# Patient Record
Sex: FEMALE | Race: WHITE | Employment: FULL TIME | URBAN - METROPOLITAN AREA
[De-identification: names, ages, dates, MRNs, and addresses within clinical notes are randomized per-mention and may not be internally consistent; named-entity substitution may affect disease eponyms.]

---

## 2017-01-09 ENCOUNTER — HOSPITAL ENCOUNTER (OUTPATIENT)
Dept: LAB | Age: 47
Discharge: HOME | End: 2017-01-09
Attending: INTERNAL MEDICINE
Payer: COMMERCIAL

## 2017-01-09 DIAGNOSIS — R06.02 SHORTNESS OF BREATH: ICD-10-CM

## 2017-01-09 LAB
BASOPHILS # BLD AUTO: 0.1 K/UL (ref 0–0.2)
BASOPHILS # BLD: 1 % (ref 0–2)
BNP SERPL-MCNC: 15 PG/ML
DIFFERENTIAL METHOD BLD: ABNORMAL
EOSINOPHIL # BLD: 0.4 K/UL (ref 0–0.8)
EOSINOPHIL NFR BLD: 5 % (ref 0.5–7.8)
ERYTHROCYTE [DISTWIDTH] IN BLOOD BY AUTOMATED COUNT: 12.4 % (ref 11.9–14.6)
HCT VFR BLD AUTO: 35.8 % (ref 35.8–46.3)
HGB BLD-MCNC: 12.4 G/DL (ref 11.7–15.4)
LYMPHOCYTES # BLD AUTO: 36 % (ref 13–44)
LYMPHOCYTES # BLD: 2.3 K/UL (ref 0.5–4.6)
MCH RBC QN AUTO: 30.5 PG (ref 26.1–32.9)
MCHC RBC AUTO-ENTMCNC: 34.6 G/DL (ref 31.4–35)
MCV RBC AUTO: 88 FL (ref 79.6–97.8)
MONOCYTES # BLD: 0.5 K/UL (ref 0.1–1.3)
MONOCYTES NFR BLD AUTO: 8 % (ref 4–12)
NEUTS SEG # BLD: 3.3 K/UL (ref 1.7–8.2)
NEUTS SEG NFR BLD AUTO: 50 % (ref 43–78)
PLATELET # BLD AUTO: 288 K/UL (ref 150–450)
PMV BLD AUTO: 9.3 FL (ref 10.8–14.1)
RBC # BLD AUTO: 4.07 M/UL (ref 4.05–5.25)
WBC # BLD AUTO: 6.5 K/UL (ref 4.3–11.1)

## 2017-01-09 PROCEDURE — 83880 ASSAY OF NATRIURETIC PEPTIDE: CPT | Performed by: INTERNAL MEDICINE

## 2017-01-09 PROCEDURE — 36415 COLL VENOUS BLD VENIPUNCTURE: CPT | Performed by: INTERNAL MEDICINE

## 2017-01-09 PROCEDURE — 85025 COMPLETE CBC W/AUTO DIFF WBC: CPT | Performed by: INTERNAL MEDICINE

## 2017-04-04 PROBLEM — K21.9 GASTROESOPHAGEAL REFLUX DISEASE WITHOUT ESOPHAGITIS: Status: ACTIVE | Noted: 2017-04-04

## 2017-07-05 ENCOUNTER — HOSPITAL ENCOUNTER (OUTPATIENT)
Dept: MAMMOGRAPHY | Age: 47
Discharge: HOME | End: 2017-07-05
Attending: OBSTETRICS & GYNECOLOGY
Payer: COMMERCIAL

## 2017-07-05 DIAGNOSIS — Z12.39 SCREENING FOR MALIGNANT NEOPLASM OF BREAST: ICD-10-CM

## 2017-07-05 PROCEDURE — 77067 SCR MAMMO BI INCL CAD: CPT

## 2017-08-01 ENCOUNTER — HOSPITAL ENCOUNTER (OUTPATIENT)
Dept: CT IMAGING | Age: 47
Discharge: HOME | End: 2017-08-01
Attending: OTOLARYNGOLOGY
Payer: COMMERCIAL

## 2017-08-01 DIAGNOSIS — J34.89 SINUS PRESSURE: ICD-10-CM

## 2017-08-01 DIAGNOSIS — R51.9 SINUS HEADACHE: ICD-10-CM

## 2017-08-01 PROCEDURE — 70486 CT MAXILLOFACIAL W/O DYE: CPT

## 2017-08-28 ENCOUNTER — HOSPITAL ENCOUNTER (OUTPATIENT)
Dept: SLEEP MEDICINE | Age: 47
Discharge: HOME | End: 2017-08-28
Attending: OTOLARYNGOLOGY
Payer: COMMERCIAL

## 2017-08-28 PROCEDURE — 95810 POLYSOM 6/> YRS 4/> PARAM: CPT

## 2017-09-27 ENCOUNTER — HOSPITAL ENCOUNTER (OUTPATIENT)
Dept: LAB | Age: 47
Discharge: HOME | End: 2017-09-27
Payer: COMMERCIAL

## 2017-09-27 VITALS — WEIGHT: 175 LBS | HEIGHT: 66 IN | BODY MASS INDEX: 28.12 KG/M2

## 2017-09-27 LAB — POTASSIUM SERPL-SCNC: 3.5 MMOL/L (ref 3.5–5.1)

## 2017-09-27 PROCEDURE — 84132 ASSAY OF SERUM POTASSIUM: CPT | Performed by: ANESTHESIOLOGY

## 2017-09-27 PROCEDURE — 36415 COLL VENOUS BLD VENIPUNCTURE: CPT | Performed by: ANESTHESIOLOGY

## 2017-09-27 RX ORDER — MINERAL OIL
180 ENEMA (ML) RECTAL
COMMUNITY

## 2017-09-27 RX ORDER — FLUTICASONE PROPIONATE 50 MCG
2 SPRAY, SUSPENSION (ML) NASAL DAILY
COMMUNITY
End: 2018-06-25

## 2017-09-27 RX ORDER — IBUPROFEN 200 MG
200 TABLET ORAL AS NEEDED
COMMUNITY

## 2017-09-27 NOTE — PERIOP NOTES
Patient verified name, , and surgery as listed in [facility]. Type 1B surgery, phone assessment complete. Orders not received. Labs per surgeon: no orders on chart at this time  Labs per anesthesia protocol: potassium needed- order placed in EMR on hold for arrival    Patient answered medical/surgical history questions at their best of ability. All prior to admission medications documented in [facility]. Patient instructed to take the following medications the day of surgery according to anesthesia guidelines with a small sip of water: zyrtec or allegra, bystolic, prilosec. Hold all vitamins 7 days prior to surgery and NSAIDS 5 days prior to surgery. Medications to be held- vitamins and ibuprofen. Patient instructed on the following:  Arrive at A Entrance, time of arrival to be called the day before by 1700  NPO after midnight including gum, mints, and ice chips  Responsible adult must drive patient to the hospital, stay during surgery, and patient will need supervision 24 hours after anesthesia  Use antibacterial soap in shower the night before surgery and on the morning of surgery  Leave all valuables (money and jewelry) at home but bring insurance card and ID on DOS  Do not wear make-up, nail polish, lotions, cologne, perfumes, powders, or oil on skin. Patient teach back successful and patient demonstrates knowledge of instruction.

## 2017-09-28 ENCOUNTER — HOSPITAL ENCOUNTER (OUTPATIENT)
Dept: SURGERY | Age: 47
Discharge: HOME | End: 2017-09-28

## 2017-10-04 ENCOUNTER — ANESTHESIA EVENT (OUTPATIENT)
Dept: SURGERY | Age: 47
End: 2017-10-04
Payer: COMMERCIAL

## 2017-10-04 RX ORDER — MIDAZOLAM HYDROCHLORIDE 1 MG/ML
2 INJECTION, SOLUTION INTRAMUSCULAR; INTRAVENOUS ONCE
Status: CANCELLED | OUTPATIENT
Start: 2017-10-04 | End: 2017-10-04

## 2017-10-04 RX ORDER — SODIUM CHLORIDE 0.9 % (FLUSH) 0.9 %
5-10 SYRINGE (ML) INJECTION AS NEEDED
Status: CANCELLED | OUTPATIENT
Start: 2017-10-04

## 2017-10-04 RX ORDER — FENTANYL CITRATE 50 UG/ML
100 INJECTION, SOLUTION INTRAMUSCULAR; INTRAVENOUS ONCE
Status: CANCELLED | OUTPATIENT
Start: 2017-10-04 | End: 2017-10-04

## 2017-10-04 RX ORDER — SODIUM CHLORIDE 0.9 % (FLUSH) 0.9 %
5-10 SYRINGE (ML) INJECTION EVERY 8 HOURS
Status: CANCELLED | OUTPATIENT
Start: 2017-10-04

## 2017-10-04 RX ORDER — CELECOXIB 200 MG/1
200 CAPSULE ORAL
Status: CANCELLED | OUTPATIENT
Start: 2017-10-04

## 2017-10-05 ENCOUNTER — HOSPITAL ENCOUNTER (OUTPATIENT)
Age: 47
Setting detail: OUTPATIENT SURGERY
Discharge: HOME | End: 2017-10-05
Attending: OTOLARYNGOLOGY | Admitting: OTOLARYNGOLOGY
Payer: COMMERCIAL

## 2017-10-05 ENCOUNTER — ANESTHESIA (OUTPATIENT)
Dept: SURGERY | Age: 47
End: 2017-10-05
Payer: COMMERCIAL

## 2017-10-05 VITALS
SYSTOLIC BLOOD PRESSURE: 115 MMHG | OXYGEN SATURATION: 93 % | HEART RATE: 52 BPM | RESPIRATION RATE: 16 BRPM | TEMPERATURE: 97.2 F | BODY MASS INDEX: 29.88 KG/M2 | DIASTOLIC BLOOD PRESSURE: 60 MMHG | HEIGHT: 66 IN | WEIGHT: 185.9 LBS

## 2017-10-05 PROCEDURE — 74011250636 HC RX REV CODE- 250/636

## 2017-10-05 PROCEDURE — 77030008528 HC TBNG IRR MIC/DEB MEDT -B: Performed by: OTOLARYNGOLOGY

## 2017-10-05 PROCEDURE — 74011000250 HC RX REV CODE- 250

## 2017-10-05 PROCEDURE — 77030020782 HC GWN BAIR PAWS FLX 3M -B: Performed by: ANESTHESIOLOGY

## 2017-10-05 PROCEDURE — 77030008357 HC SPLNT NSL INT THOM -B: Performed by: OTOLARYNGOLOGY

## 2017-10-05 PROCEDURE — 77030008703 HC TU ET UNCUF COVD -A: Performed by: ANESTHESIOLOGY

## 2017-10-05 PROCEDURE — 74011000250 HC RX REV CODE- 250: Performed by: OTOLARYNGOLOGY

## 2017-10-05 PROCEDURE — 77030002996 HC SUT SLK J&J -A: Performed by: OTOLARYNGOLOGY

## 2017-10-05 PROCEDURE — 74011250636 HC RX REV CODE- 250/636: Performed by: ANESTHESIOLOGY

## 2017-10-05 PROCEDURE — 74011250637 HC RX REV CODE- 250/637: Performed by: OTOLARYNGOLOGY

## 2017-10-05 PROCEDURE — 76210000020 HC REC RM PH II FIRST 0.5 HR: Performed by: OTOLARYNGOLOGY

## 2017-10-05 PROCEDURE — 77030017013 HC DRSG SNUS MRGEL1 MEDT -B: Performed by: OTOLARYNGOLOGY

## 2017-10-05 PROCEDURE — 74011000250 HC RX REV CODE- 250: Performed by: ANESTHESIOLOGY

## 2017-10-05 PROCEDURE — 77030014153 HC WND COBLATN ENT S&N -C: Performed by: OTOLARYNGOLOGY

## 2017-10-05 PROCEDURE — 77030008477 HC STYL SATN SLP COVD -A: Performed by: ANESTHESIOLOGY

## 2017-10-05 PROCEDURE — 77030018836 HC SOL IRR NACL ICUM -A: Performed by: OTOLARYNGOLOGY

## 2017-10-05 PROCEDURE — 76010000161 HC OR TIME 1 TO 1.5 HR INTENSV-TIER 1: Performed by: OTOLARYNGOLOGY

## 2017-10-05 PROCEDURE — 74011250636 HC RX REV CODE- 250/636: Performed by: OTOLARYNGOLOGY

## 2017-10-05 PROCEDURE — 77030006908 HC BLD SNUS SHV MEDT -D: Performed by: OTOLARYNGOLOGY

## 2017-10-05 PROCEDURE — 76210000006 HC OR PH I REC 0.5 TO 1 HR: Performed by: OTOLARYNGOLOGY

## 2017-10-05 PROCEDURE — 76060000033 HC ANESTHESIA 1 TO 1.5 HR: Performed by: OTOLARYNGOLOGY

## 2017-10-05 RX ORDER — KETOROLAC TROMETHAMINE 30 MG/ML
INJECTION, SOLUTION INTRAMUSCULAR; INTRAVENOUS AS NEEDED
Status: DISCONTINUED | OUTPATIENT
Start: 2017-10-05 | End: 2017-10-05 | Stop reason: HOSPADM

## 2017-10-05 RX ORDER — SODIUM CHLORIDE, SODIUM LACTATE, POTASSIUM CHLORIDE, CALCIUM CHLORIDE 600; 310; 30; 20 MG/100ML; MG/100ML; MG/100ML; MG/100ML
75 INJECTION, SOLUTION INTRAVENOUS CONTINUOUS
Status: DISCONTINUED | OUTPATIENT
Start: 2017-10-05 | End: 2017-10-05 | Stop reason: HOSPADM

## 2017-10-05 RX ORDER — PROPOFOL 10 MG/ML
INJECTION, EMULSION INTRAVENOUS AS NEEDED
Status: DISCONTINUED | OUTPATIENT
Start: 2017-10-05 | End: 2017-10-05 | Stop reason: HOSPADM

## 2017-10-05 RX ORDER — LIDOCAINE HYDROCHLORIDE 10 MG/ML
0.1 INJECTION INFILTRATION; PERINEURAL AS NEEDED
Status: DISCONTINUED | OUTPATIENT
Start: 2017-10-05 | End: 2017-10-05 | Stop reason: HOSPADM

## 2017-10-05 RX ORDER — EPINEPHRINE 1 MG/ML
INJECTION, SOLUTION, CONCENTRATE INTRAVENOUS AS NEEDED
Status: DISCONTINUED | OUTPATIENT
Start: 2017-10-05 | End: 2017-10-05 | Stop reason: HOSPADM

## 2017-10-05 RX ORDER — LIDOCAINE HYDROCHLORIDE 10 MG/ML
INJECTION INFILTRATION; PERINEURAL AS NEEDED
Status: DISCONTINUED | OUTPATIENT
Start: 2017-10-05 | End: 2017-10-05 | Stop reason: HOSPADM

## 2017-10-05 RX ORDER — OXYCODONE HYDROCHLORIDE 5 MG/1
5 TABLET ORAL
Status: DISCONTINUED | OUTPATIENT
Start: 2017-10-05 | End: 2017-10-05 | Stop reason: HOSPADM

## 2017-10-05 RX ORDER — ROCURONIUM BROMIDE 10 MG/ML
INJECTION, SOLUTION INTRAVENOUS AS NEEDED
Status: DISCONTINUED | OUTPATIENT
Start: 2017-10-05 | End: 2017-10-05 | Stop reason: HOSPADM

## 2017-10-05 RX ORDER — OXYMETAZOLINE HCL 0.05 %
2 SPRAY, NON-AEROSOL (ML) NASAL
Status: DISCONTINUED | OUTPATIENT
Start: 2017-10-05 | End: 2017-10-05 | Stop reason: HOSPADM

## 2017-10-05 RX ORDER — SODIUM CHLORIDE 0.9 % (FLUSH) 0.9 %
5-10 SYRINGE (ML) INJECTION AS NEEDED
Status: DISCONTINUED | OUTPATIENT
Start: 2017-10-05 | End: 2017-10-05 | Stop reason: HOSPADM

## 2017-10-05 RX ORDER — KETAMINE HYDROCHLORIDE 50 MG/ML
INJECTION, SOLUTION INTRAMUSCULAR; INTRAVENOUS AS NEEDED
Status: DISCONTINUED | OUTPATIENT
Start: 2017-10-05 | End: 2017-10-05 | Stop reason: HOSPADM

## 2017-10-05 RX ORDER — MIDAZOLAM HYDROCHLORIDE 1 MG/ML
2 INJECTION, SOLUTION INTRAMUSCULAR; INTRAVENOUS
Status: DISCONTINUED | OUTPATIENT
Start: 2017-10-05 | End: 2017-10-05 | Stop reason: HOSPADM

## 2017-10-05 RX ORDER — NEOSTIGMINE METHYLSULFATE 1 MG/ML
INJECTION INTRAVENOUS AS NEEDED
Status: DISCONTINUED | OUTPATIENT
Start: 2017-10-05 | End: 2017-10-05 | Stop reason: HOSPADM

## 2017-10-05 RX ORDER — HYDROMORPHONE HYDROCHLORIDE 2 MG/ML
0.5 INJECTION, SOLUTION INTRAMUSCULAR; INTRAVENOUS; SUBCUTANEOUS
Status: DISCONTINUED | OUTPATIENT
Start: 2017-10-05 | End: 2017-10-05 | Stop reason: HOSPADM

## 2017-10-05 RX ORDER — BACITRACIN ZINC 500 UNIT/G
OINTMENT (GRAM) TOPICAL AS NEEDED
Status: DISCONTINUED | OUTPATIENT
Start: 2017-10-05 | End: 2017-10-05 | Stop reason: HOSPADM

## 2017-10-05 RX ORDER — GLYCOPYRROLATE 0.2 MG/ML
INJECTION INTRAMUSCULAR; INTRAVENOUS AS NEEDED
Status: DISCONTINUED | OUTPATIENT
Start: 2017-10-05 | End: 2017-10-05 | Stop reason: HOSPADM

## 2017-10-05 RX ORDER — DEXAMETHASONE SODIUM PHOSPHATE 4 MG/ML
INJECTION, SOLUTION INTRA-ARTICULAR; INTRALESIONAL; INTRAMUSCULAR; INTRAVENOUS; SOFT TISSUE AS NEEDED
Status: DISCONTINUED | OUTPATIENT
Start: 2017-10-05 | End: 2017-10-05 | Stop reason: HOSPADM

## 2017-10-05 RX ORDER — FENTANYL CITRATE 50 UG/ML
INJECTION, SOLUTION INTRAMUSCULAR; INTRAVENOUS AS NEEDED
Status: DISCONTINUED | OUTPATIENT
Start: 2017-10-05 | End: 2017-10-05 | Stop reason: HOSPADM

## 2017-10-05 RX ORDER — LIDOCAINE HYDROCHLORIDE 20 MG/ML
INJECTION, SOLUTION EPIDURAL; INFILTRATION; INTRACAUDAL; PERINEURAL AS NEEDED
Status: DISCONTINUED | OUTPATIENT
Start: 2017-10-05 | End: 2017-10-05 | Stop reason: HOSPADM

## 2017-10-05 RX ORDER — ONDANSETRON 2 MG/ML
INJECTION INTRAMUSCULAR; INTRAVENOUS AS NEEDED
Status: DISCONTINUED | OUTPATIENT
Start: 2017-10-05 | End: 2017-10-05 | Stop reason: HOSPADM

## 2017-10-05 RX ORDER — SODIUM CHLORIDE, SODIUM LACTATE, POTASSIUM CHLORIDE, CALCIUM CHLORIDE 600; 310; 30; 20 MG/100ML; MG/100ML; MG/100ML; MG/100ML
50 INJECTION, SOLUTION INTRAVENOUS CONTINUOUS
Status: DISCONTINUED | OUTPATIENT
Start: 2017-10-05 | End: 2017-10-05 | Stop reason: HOSPADM

## 2017-10-05 RX ORDER — ALBUTEROL SULFATE 0.83 MG/ML
2.5 SOLUTION RESPIRATORY (INHALATION) AS NEEDED
Status: DISCONTINUED | OUTPATIENT
Start: 2017-10-05 | End: 2017-10-05 | Stop reason: HOSPADM

## 2017-10-05 RX ADMIN — SODIUM CHLORIDE, SODIUM LACTATE, POTASSIUM CHLORIDE, AND CALCIUM CHLORIDE 75 ML/HR: 600; 310; 30; 20 INJECTION, SOLUTION INTRAVENOUS at 06:58

## 2017-10-05 RX ADMIN — ROCURONIUM BROMIDE 50 MG: 10 INJECTION, SOLUTION INTRAVENOUS at 07:58

## 2017-10-05 RX ADMIN — FENTANYL CITRATE 50 MCG: 50 INJECTION, SOLUTION INTRAMUSCULAR; INTRAVENOUS at 07:56

## 2017-10-05 RX ADMIN — LIDOCAINE HYDROCHLORIDE 60 MG: 20 INJECTION, SOLUTION EPIDURAL; INFILTRATION; INTRACAUDAL; PERINEURAL at 07:58

## 2017-10-05 RX ADMIN — PROPOFOL 150 MG: 10 INJECTION, EMULSION INTRAVENOUS at 07:58

## 2017-10-05 RX ADMIN — LIDOCAINE HYDROCHLORIDE 0.1 ML: 10 INJECTION, SOLUTION INFILTRATION; PERINEURAL at 06:58

## 2017-10-05 RX ADMIN — GLYCOPYRROLATE 0.4 MG: 0.2 INJECTION INTRAMUSCULAR; INTRAVENOUS at 08:46

## 2017-10-05 RX ADMIN — KETAMINE HYDROCHLORIDE 50 MG: 50 INJECTION, SOLUTION INTRAMUSCULAR; INTRAVENOUS at 08:04

## 2017-10-05 RX ADMIN — KETOROLAC TROMETHAMINE 30 MG: 30 INJECTION, SOLUTION INTRAMUSCULAR; INTRAVENOUS at 08:47

## 2017-10-05 RX ADMIN — MIDAZOLAM HYDROCHLORIDE 2 MG: 1 INJECTION, SOLUTION INTRAMUSCULAR; INTRAVENOUS at 07:29

## 2017-10-05 RX ADMIN — HYDROMORPHONE HYDROCHLORIDE 0.5 MG: 2 INJECTION, SOLUTION INTRAMUSCULAR; INTRAVENOUS; SUBCUTANEOUS at 09:11

## 2017-10-05 RX ADMIN — NEOSTIGMINE METHYLSULFATE 3 MG: 1 INJECTION INTRAVENOUS at 08:46

## 2017-10-05 RX ADMIN — DEXAMETHASONE SODIUM PHOSPHATE 10 MG: 4 INJECTION, SOLUTION INTRA-ARTICULAR; INTRALESIONAL; INTRAMUSCULAR; INTRAVENOUS; SOFT TISSUE at 08:12

## 2017-10-05 RX ADMIN — OXYMETAZOLINE HYDROCHLORIDE 2 SPRAY: 5 SPRAY NASAL at 07:25

## 2017-10-05 RX ADMIN — ONDANSETRON 4 MG: 2 INJECTION INTRAMUSCULAR; INTRAVENOUS at 08:12

## 2017-10-05 RX ADMIN — FENTANYL CITRATE 50 MCG: 50 INJECTION, SOLUTION INTRAMUSCULAR; INTRAVENOUS at 07:58

## 2017-10-05 RX ADMIN — HYDROMORPHONE HYDROCHLORIDE 0.5 MG: 2 INJECTION, SOLUTION INTRAMUSCULAR; INTRAVENOUS; SUBCUTANEOUS at 09:31

## 2017-10-05 RX ADMIN — HYDROMORPHONE HYDROCHLORIDE 0.5 MG: 2 INJECTION, SOLUTION INTRAMUSCULAR; INTRAVENOUS; SUBCUTANEOUS at 09:18

## 2017-10-05 NOTE — DISCHARGE INSTRUCTIONS
Please refer to discharge instructions given in Dr. Jordon Nguyen office. If you have any problems or question please call his office at [phone]    Instructions after Anesthesia:    · For the first 24 hours DO NOT Drive,Drink alcoholic beverages, or Make important decisions. · Be aware of dizziness following anesthesia and while taking pain medication. NASAL SURGERY    Things to Remember Before Surgery    1. Nothing to eat or drink after midnight the night before surgery. 2. Surgery may be canceled if there is a fever or chest congestion within 48 hours of surgery. Things to Remember After Surgery    1. Sleep and rest with head elevated on several pillows to decrease swelling and pressure. 2. You will have a 2 X 2 gauze under your nose to absorb the bloody discharge you will have the first 2 to 3 days after surgery. If you saturate the gauze more than 3 times in 30 minutes, please notify the office. 3. Numbness to the front teeth after nasal surgery is normal. The feeling usually returns in 6 to 8 weeks. 4. Clean the end of your nose with hydrogen peroxide. Do not try to clean inside. It may cause bleeding. 5. Do not blow your nose until you have had your first post-op appointment ( at least one week after surgery). 6. For a mild pain and a low-grade temperature, you may take Tylenol. No aspirin, Motrin, Advil, or Aleve for at least 3 weeks after nasal surgery. 7. If the nasal surgery requires an exterior cast and the cast falls off, please notify the office during office hours. 8. Nasal congestion after surgery is normal and will resolve after the splints and/or packing are removed.

## 2017-10-05 NOTE — ANESTHESIA POSTPROCEDURE EVALUATION
Post-Anesthesia Evaluation and Assessment    Patient: Aimee Ricketts MRN: [id number]  SSN: [id number]    YOB: 1970  Age: 48 y.o. Sex: female       Cardiovascular Function/Vital Signs  Visit Vitals    /63    Pulse 60    Temp 36.2 °C (97.2 °F)    Resp 16    Ht 5' 6\" (1.676 m)    Wt 84.3 kg (185 lb 14.4 oz)    SpO2 93%    BMI 30.01 kg/m2       Patient is status post general anesthesia for Procedure(s):  SINUS SURGERY ENDOSCOPIC WITH IMAGE GUIDANCE  LEFT MAXILLARY IRRIGATION  TURBINATE REDUCTION OUT FRACTURE INFERIOR . Nausea/Vomiting: None    Postoperative hydration reviewed and adequate. Pain:  Pain Scale 1: Visual (10/05/17 0951)  Pain Intensity 1: 0 (10/05/17 0951)   Managed    Neurological Status:   Neuro (WDL): Exceptions to WDL (10/05/17 8084)  Neuro  Neurologic State: Drowsy (10/05/17 6685)  Orientation Level: Oriented X4 (10/05/17 0951)   At baseline    Mental Status and Level of Consciousness: Arousable    Pulmonary Status:   O2 Device: Room air (10/05/17 0951)   Adequate oxygenation and airway patent    Complications related to anesthesia: None    Post-anesthesia assessment completed.  No concerns    Signed By: Rosibel De La Torre MD     October 5, 2017

## 2017-10-05 NOTE — OP NOTES
[facility] 63   OPERATIVE REPORT       Name:  Maxim Carlos   MR#:  [id number]   :  1970   Account #:  [de-identified]   Date of Adm:  10/05/2017       PREOPERATIVE DIAGNOSES   1. Chronic and recurrent sinusitis. 2. Deviated nasal septum. 3. Inferior turbinate hypertrophy. 4. Nasal obstruction. POSTOPERATIVE DIAGNOSES   1. Chronic and recurrent sinusitis. 2. Deviated nasal septum. 3. Inferior turbinate hypertrophy. 4. Nasal obstruction. PROCEDURE: Functional endoscopic sinus surgery with bilateral   maxillary antrostomy (left maxillary irrigation), bilateral   total ethmoidectomy, cauterization and outfracture of inferior   turbinates with image guided system. SURGEON: Linus Arechiga III, MD    ANESTHESIA: General endotracheal.     FINDINGS: The patient had mild septal deviation. She had   inferior turbinate hypertrophy. She had thickened mucosa   throughout scattered ethmoid air cells. She had some edema at   the middle meatal area bilaterally. OPERATION: The patient was placed in supine position. General   endotracheal anesthesia was induced. One percent Xylocaine with   1:100,000 epinephrine was injected into the middle turbinates   bilaterally. Afrin soaked cotton pledgets were placed in the   nasal cavity bilaterally. The image-guided system was placed on   the forehead, and tracing was performed and accepted. She was   then prepped and draped in normal sterile fashion. Nasal   vibrissae were trimmed. Cotton pledgets were removed. Attention was turned to the left nasal cavity. With endoscopic   visualization, the middle turbinate was infractured. There was   some edema of the uncinate process which was removed with the   microdebrider. There was a large left maxillary antrostomy,   which appeared to be contiguous with the natural ostia.  Then the   microdebrider was in continuous mode navigation, was utilized to   widely open the ethmoid sinus, almost to the roof of the ethmoid   sinus, almost to the lamina papyracea, removing some thickened   mucosa. This procedure was performed on the right side for the   maxillary and ethmoid sinus. Then the Coblator was used at a   setting of 4 to treat the medial surface of the inferior   turbinates in 2 submucosal positions. This was performed   bilaterally. The inferior turbinates were outfractured. Coagulated blood was suctioned from the sinus cavities. One   folded MeroGel was placed in the left middle meatal area, 2   folded MeroGel in the right middle meatal area. Das splints   were placed and anchored anteriorly with 2-0 silk. The patient   was then awakened, transferred to recovery room in good   condition. ESTIMATED BLOOD LOSS: 50 mL.         MD Jann Mancuso   D:  10/05/2017   09:26   T:  10/05/2017   10:52   Job #:  [id number]

## 2017-10-05 NOTE — IP AVS SNAPSHOT
[address] [place] [address] 
[phone] Patient: Duyen Grigsby MRN: [id number] :1970 You are allergic to the following Allergen Reactions Flagyl (Metronidazole) Rash Recent Documentation Height Weight BMI OB Status Smoking Status 1.676 m 84.3 kg 30.01 kg/m2 Hysterectomy Never Smoker Emergency Contacts Name Discharge Info Relation Home Work Mobile [address] [facility],[address] CAREGIVER [3] Spouse [3]   [phone] About your hospitalization You were admitted on:  2017 You last received care in the:  [facility] PACU You were discharged on:  2017 Unit phone number:  [phone] Why you were hospitalized Your primary diagnosis was:  Not on File Providers Seen During Your Hospitalizations Provider Role Specialty Primary office phone Sheryl Agrawal MD Attending Provider Otolaryngology [phone] Your Primary Care Physician (PCP) Primary Care Physician Office Phone Office Fax [address] [facility] Kylee Friednaveen [facility] [phone] Follow-up Information Follow up With Details Comments Contact Info Sheryl Agrawal MD Schedule an appointment as soon as possible for a visit today  [place] [address] [facility] [address] 
[phone] Your Appointments   9:00 AM EDT  
POST OP with MD HAK[facility] [address] [facility]) [address] [place] [address]  
[phone] 2017  8:15 AM EDT Follow Up with Lalo Bergman MD  
[facility] ([address] [place] [address]) [address] [place] [address]  
[phone] Current Discharge Medication List  
  
ASK your doctor about these medications Dose & Instructions Dispensing Information Comments Morning Noon Evening Bedtime Azelastine 0.15 % (205.5 mcg) nasal spray Commonly known as:  ASTEPRO Your last dose was: Your next dose is:    
   
   
 Dose:  2 Spray 2 Sprays by Both Nostrils route two (2) times a day. Quantity:  1 Bottle Refills:  6  
     
   
   
   
  
 cetirizine 10 mg tablet Commonly known as:  ZYRTEC Your last dose was: Your next dose is: Take  by mouth daily as needed. Refills:  0  
     
   
   
   
  
 fexofenadine 180 mg tablet Commonly known as:  Jessenia Neighbors Your last dose was: Your next dose is:    
   
   
 Dose:  180 mg Take 180 mg by mouth daily as needed. Switches between zyrtec and allegra. Refills:  0  
     
   
   
   
  
 FLONASE 50 mcg/actuation nasal spray Generic drug:  fluticasone Your last dose was: Your next dose is:    
   
   
 Dose:  2 Spray 2 Sprays by Both Nostrils route daily. Refills:  0  
     
   
   
   
  
 ibuprofen 200 mg tablet Commonly known as:  MOTRIN Your last dose was: Your next dose is:    
   
   
 Dose:  200 mg Take 200 mg by mouth as needed for Pain. Refills:  0  
     
   
   
   
  
 losartan-hydroCHLOROthiazide 100-25 mg per tablet Commonly known as:  HYZAAR Your last dose was: Your next dose is:    
   
   
 Dose:  1 Tab Take 1 Tab by mouth daily. Quantity:  90 Tab Refills:  2  
     
   
   
   
  
 naltrexone-buPROPion 8-90 mg Tber ER tablet Commonly known as:  Stacy Peant Your last dose was: Your next dose is:    
   
   
 Week 1 1 tab PO QAM, Week 2 1QAM 1QHS, Week 3 2QAM 1 QHS, Week 4 & beyond 2QAM 2QHS Quantity:  360 Tab Refills:  3  
     
   
   
   
  
 nebivolol 10 mg tablet Commonly known as:  BYSTOLIC Your last dose was: Your next dose is: Take 0.5 tab qd Quantity:  90 Tab Refills:  3 omeprazole 40 mg capsule Commonly known as:  PRILOSEC Your last dose was: Your next dose is:    
   
   
 Dose:  40 mg Take 1 Cap by mouth daily. Quantity:  90 Cap Refills:  3  
     
   
   
   
  
 ondansetron 8 mg disintegrating tablet Commonly known as:  ZOFRAN ODT Your last dose was: Your next dose is:    
   
   
 1 tablet Q 8 hours PRN N/V Quantity:  8 Tab Refills:  0  
     
   
   
   
  
 * OTHER Your last dose was: Your next dose is:    
   
   
 Estradiol 0.1 mg/Testosterone 1 mg haja  #30 Use one daily Quantity:  30 Haja Refills:  5  
     
   
   
   
  
 * OTHER Your last dose was: Your next dose is:    
   
   
 Estradiol 0.2mg haja Quantity:  30 Haja Refills:  5  
     
   
   
   
  
 oxyCODONE 5 mg capsule Commonly known as:  OXYIR Your last dose was: Your next dose is:    
   
   
 1 Tablet Q 4-6 hours PRN SEVERE PAIN Quantity:  30 Cap Refills:  0  
     
   
   
   
  
 traZODone 100 mg tablet Commonly known as:  Marcio Cramer Your last dose was: Your next dose is:    
   
   
 Dose:  100 mg Take 1 Tab by mouth nightly. Quantity:  90 Tab Refills:  2  
     
   
   
   
  
 * Notice: This list has 2 medication(s) that are the same as other medications prescribed for you. Read the directions carefully, and ask your doctor or other care provider to review them with you. Discharge Instructions Please refer to discharge instructions given in Dr. Dana Curtis office. If you have any problems or question please call his office at [phone] Instructions after Anesthesia: 
 
· For the first 24 hours DO NOT Drive,Drink alcoholic beverages, or Make important decisions. · Be aware of dizziness following anesthesia and while taking pain medication. NASAL SURGERY Things to Remember Before Surgery 1. Nothing to eat or drink after midnight the night before surgery. 2. Surgery may be canceled if there is a fever or chest congestion within 48 hours of surgery. Things to Remember After Surgery 1. Sleep and rest with head elevated on several pillows to decrease swelling and pressure. 2. You will have a 2 X 2 gauze under your nose to absorb the bloody discharge you will have the first 2 to 3 days after surgery. If you saturate the gauze more than 3 times in 30 minutes, please notify the office. 3. Numbness to the front teeth after nasal surgery is normal. The feeling usually returns in 6 to 8 weeks. 4. Clean the end of your nose with hydrogen peroxide. Do not try to clean inside. It may cause bleeding. 5. Do not blow your nose until you have had your first post-op appointment ( at least one week after surgery). 6. For a mild pain and a low-grade temperature, you may take Tylenol. No aspirin, Motrin, Advil, or Aleve for at least 3 weeks after nasal surgery. 7. If the nasal surgery requires an exterior cast and the cast falls off, please notify the office during office hours. 8. Nasal congestion after surgery is normal and will resolve after the splints and/or packing are removed. Discharge Orders None Introducing [facility] SERVICES! Dear Raza Costello: Thank you for requesting a [employer] account. Our records indicate that you already have an active [employer] account. You can access your account anytime at [email or web address] Did you know that you can access your hospital and ER discharge instructions at any time in [employer]? You can also review all of your test results from your hospital stay or ER visit. Additional Information If you have questions, please visit the Frequently Asked Questions section of the [employer] website at [email or web address] Remember, [employer] is NOT to be used for urgent needs.  For medical emergencies, dial 911. Now available from your iPhone and Android! General Information Please provide this summary of care documentation to your next provider. Patient Signature:  ____________________________________________________________ Date:  ____________________________________________________________  
  
Caden Emma Provider Signature:  ____________________________________________________________ Date:  ____________________________________________________________

## 2017-10-05 NOTE — ANESTHESIA PREPROCEDURE EVALUATION
Anesthetic History   No history of anesthetic complications            Review of Systems / Medical History  Patient summary reviewed, nursing notes reviewed and pertinent labs reviewed    Pulmonary        Sleep apnea: CPAP           Neuro/Psych         Psychiatric history     Cardiovascular    Hypertension              Exercise tolerance: >4 METS     GI/Hepatic/Renal     GERD: well controlled           Endo/Other  Within defined limits           Other Findings              Physical Exam    Airway  Mallampati: I      Mouth opening: Normal     Cardiovascular  Regular rate and rhythm,  S1 and S2 normal,  no murmur, click, rub, or gallop             Dental  No notable dental hx       Pulmonary  Breath sounds clear to auscultation               Abdominal         Other Findings            Anesthetic Plan    ASA: 2  Anesthesia type: general          Induction: Intravenous  Anesthetic plan and risks discussed with: Patient and Spouse

## 2017-10-05 NOTE — BRIEF OP NOTE
BRIEF OPERATIVE NOTE    Date of Procedure: 10/5/2017   Preoperative Diagnosis: Chronic maxillary sinusitis [J32.0]  Deviated nasal septum [J34.2]  Hypertrophy of nasal turbinates [J34.3]  Nasal obstruction without choanal atresia [J34.89]  Postoperative Diagnosis: Chronic maxillary sinusitis [J32.0]  Deviated nasal septum [J34.2]  Hypertrophy of nasal turbinates [J34.3]  Nasal obstruction without choanal atresia [J34.89]    Procedure(s):  SINUS SURGERY ENDOSCOPIC WITH IMAGE GUIDANCE  BMA   LEFT MAXILLARY IRRIGATION  BTE    TURBINATE CAUTERIZE  OUT FRACTURE INFERIOR TURBINATES  Surgeon(s) and Role:     * KRISH Prescott III, MD - Primary         Assistant Staff:       Surgical Staff:  Circ-1: Tarsha Luevano RN  Scrub Tech-1: Eda Saini  Scrub Tech-2: Steve Banuelos  Event Time In   Incision Start 1867   Incision Close 0854     Anesthesia: General   Estimated Blood Loss: 50 cc  Specimens: * No specimens in log *   Findings: Mild DNS  ITH  Chronic sinus changes    Complications:   Implants: * No implants in log *

## 2017-10-06 ENCOUNTER — PATIENT OUTREACH (OUTPATIENT)
Dept: OTHER | Age: 47
End: 2017-10-06

## 2017-10-06 NOTE — PROGRESS NOTES
First attempt to reach patient to offer [employer] Benefits CM. Discreet voice mail message left with this CM contact information. Will attempt to contact patient again.

## 2017-10-13 ENCOUNTER — PATIENT OUTREACH (OUTPATIENT)
Dept: OTHER | Age: 47
End: 2017-10-13

## 2017-10-13 NOTE — PROGRESS NOTES
Transition Of Care Note    Patient discharged from [facility] for Sinus Surgery. Medical History:     Past Medical History:   Diagnosis Date    Allergic rhinitis due to pollen 2014    Cervical radiculopathy 2014    C5 and C6    Depression (emotion) 2014    GERD (gastroesophageal reflux disease)     w/sore throat; neg h.pylori- prilosec controls well    Hypertension     Insomnia     Onychomycosis     Sleep apnea 2017    using cpap machine    Thyroid nodule     tsh normal       Care Manager contacted the patient by telephone to perform post hospital(hospital/ED) discharge assessment. Verified  and zip code with patient as identifiers. Provided introduction to self, and explanation of the Nurse Care Manager role. Medication:   Performed medication reconciliation with patient, and patient verbalizes understanding of administration of home medications. There were no barriers to obtaining medications identified at this time. Support system:  patient and spouse    Discharge Instructions :  Reviewed discharge instructions with patient. Patient verbalizes understanding of discharge instructions and follow-up care. Red Flags: Take medications as indicated, you have packing in your nose call for bleeding, temperature or severe headache. Advance Care Planning:   Patient was offered the opportunity to discuss advance care planning:  no     Does patient have an Advance Directive:  no   If no, did you provide information on [facility]ctions?  no     PCP/Specialist follow up: Patient scheduled to follow up with Clif Douglas MD on 10/12/17. Reviewed red flags with patient, and patient verbalizes understanding. Patient given an opportunity to ask questions. No other clinical/social/functional needs noted. The patient agrees to contact the PCP office for questions related to their healthcare.   The patient expressed thanks, offered no additional questions and ended the call.

## 2018-01-29 ENCOUNTER — HOSPITAL ENCOUNTER (OUTPATIENT)
Dept: GENERAL RADIOLOGY | Age: 48
Discharge: HOME | End: 2018-01-29
Payer: COMMERCIAL

## 2018-01-29 DIAGNOSIS — R05.9 COUGH: ICD-10-CM

## 2018-01-29 PROCEDURE — 71046 X-RAY EXAM CHEST 2 VIEWS: CPT

## 2018-01-31 ENCOUNTER — APPOINTMENT (OUTPATIENT)
Dept: GENERAL RADIOLOGY | Age: 48
End: 2018-01-31
Attending: EMERGENCY MEDICINE
Payer: COMMERCIAL

## 2018-01-31 ENCOUNTER — HOSPITAL ENCOUNTER (EMERGENCY)
Age: 48
Discharge: HOME | End: 2018-01-31
Attending: EMERGENCY MEDICINE
Payer: COMMERCIAL

## 2018-01-31 ENCOUNTER — TELEPHONE (OUTPATIENT)
Dept: EMERGENCY DEPT | Age: 48
End: 2018-01-31

## 2018-01-31 ENCOUNTER — APPOINTMENT (OUTPATIENT)
Dept: CT IMAGING | Age: 48
End: 2018-01-31
Attending: EMERGENCY MEDICINE
Payer: COMMERCIAL

## 2018-01-31 VITALS
BODY MASS INDEX: 29.25 KG/M2 | OXYGEN SATURATION: 99 % | SYSTOLIC BLOOD PRESSURE: 126 MMHG | HEART RATE: 86 BPM | TEMPERATURE: 98.4 F | HEIGHT: 66 IN | DIASTOLIC BLOOD PRESSURE: 72 MMHG | WEIGHT: 182 LBS | RESPIRATION RATE: 18 BRPM

## 2018-01-31 DIAGNOSIS — J69.0 ASPIRATION PNEUMONIA OF RIGHT LOWER LOBE, UNSPECIFIED ASPIRATION PNEUMONIA TYPE (HCC): Primary | ICD-10-CM

## 2018-01-31 LAB
ALBUMIN SERPL-MCNC: 3.6 G/DL (ref 3.5–5)
ALBUMIN/GLOB SERPL: 0.8 {RATIO} (ref 1.2–3.5)
ALP SERPL-CCNC: 112 U/L (ref 50–136)
ALT SERPL-CCNC: 24 U/L (ref 12–65)
ANION GAP SERPL CALC-SCNC: 11 MMOL/L (ref 7–16)
AST SERPL-CCNC: 18 U/L (ref 15–37)
BASOPHILS # BLD: 0 K/UL (ref 0–0.2)
BASOPHILS NFR BLD: 1 % (ref 0–2)
BILIRUB SERPL-MCNC: 0.3 MG/DL (ref 0.2–1.1)
BUN SERPL-MCNC: 14 MG/DL (ref 6–23)
CALCIUM SERPL-MCNC: 8.9 MG/DL (ref 8.3–10.4)
CHLORIDE SERPL-SCNC: 103 MMOL/L (ref 98–107)
CO2 SERPL-SCNC: 25 MMOL/L (ref 21–32)
CREAT SERPL-MCNC: 0.88 MG/DL (ref 0.6–1)
DIFFERENTIAL METHOD BLD: ABNORMAL
EOSINOPHIL # BLD: 0.3 K/UL (ref 0–0.8)
EOSINOPHIL NFR BLD: 7 % (ref 0.5–7.8)
ERYTHROCYTE [DISTWIDTH] IN BLOOD BY AUTOMATED COUNT: 12.7 % (ref 11.9–14.6)
FLUAV AG NPH QL IA: NEGATIVE
FLUBV AG NPH QL IA: NEGATIVE
GLOBULIN SER CALC-MCNC: 4.3 G/DL (ref 2.3–3.5)
GLUCOSE SERPL-MCNC: 107 MG/DL (ref 65–100)
HCT VFR BLD AUTO: 40.5 % (ref 35.8–46.3)
HGB BLD-MCNC: 14.5 G/DL (ref 11.7–15.4)
IMM GRANULOCYTES # BLD: 0 K/UL (ref 0–0.5)
IMM GRANULOCYTES NFR BLD AUTO: 0 % (ref 0–5)
LACTATE BLD-SCNC: 1.4 MMOL/L (ref 0.5–1.9)
LIPASE SERPL-CCNC: 111 U/L (ref 73–393)
LYMPHOCYTES # BLD: 2.2 K/UL (ref 0.5–4.6)
LYMPHOCYTES NFR BLD: 48 % (ref 13–44)
MCH RBC QN AUTO: 30.1 PG (ref 26.1–32.9)
MCHC RBC AUTO-ENTMCNC: 35.8 G/DL (ref 31.4–35)
MCV RBC AUTO: 84 FL (ref 79.6–97.8)
MONOCYTES # BLD: 0.3 K/UL (ref 0.1–1.3)
MONOCYTES NFR BLD: 8 % (ref 4–12)
NEUTS SEG # BLD: 1.6 K/UL (ref 1.7–8.2)
NEUTS SEG NFR BLD: 36 % (ref 43–78)
PLATELET # BLD AUTO: 255 K/UL (ref 150–450)
PMV BLD AUTO: 9.5 FL (ref 10.8–14.1)
POTASSIUM SERPL-SCNC: 3.3 MMOL/L (ref 3.5–5.1)
PROCALCITONIN SERPL-MCNC: <0.1 NG/ML
PROT SERPL-MCNC: 7.9 G/DL (ref 6.3–8.2)
RBC # BLD AUTO: 4.82 M/UL (ref 4.05–5.25)
SODIUM SERPL-SCNC: 139 MMOL/L (ref 136–145)
WBC # BLD AUTO: 4.4 K/UL (ref 4.3–11.1)

## 2018-01-31 PROCEDURE — 87804 INFLUENZA ASSAY W/OPTIC: CPT | Performed by: EMERGENCY MEDICINE

## 2018-01-31 PROCEDURE — 85025 COMPLETE CBC W/AUTO DIFF WBC: CPT | Performed by: EMERGENCY MEDICINE

## 2018-01-31 PROCEDURE — 84145 PROCALCITONIN (PCT): CPT | Performed by: EMERGENCY MEDICINE

## 2018-01-31 PROCEDURE — 80053 COMPREHEN METABOLIC PANEL: CPT | Performed by: EMERGENCY MEDICINE

## 2018-01-31 PROCEDURE — 74011636320 HC RX REV CODE- 636/320: Performed by: EMERGENCY MEDICINE

## 2018-01-31 PROCEDURE — 71046 X-RAY EXAM CHEST 2 VIEWS: CPT

## 2018-01-31 PROCEDURE — 83605 ASSAY OF LACTIC ACID: CPT

## 2018-01-31 PROCEDURE — 74011250637 HC RX REV CODE- 250/637: Performed by: EMERGENCY MEDICINE

## 2018-01-31 PROCEDURE — 74011000250 HC RX REV CODE- 250: Performed by: EMERGENCY MEDICINE

## 2018-01-31 PROCEDURE — 99284 EMERGENCY DEPT VISIT MOD MDM: CPT | Performed by: EMERGENCY MEDICINE

## 2018-01-31 PROCEDURE — 96365 THER/PROPH/DIAG IV INF INIT: CPT | Performed by: EMERGENCY MEDICINE

## 2018-01-31 PROCEDURE — 74011250636 HC RX REV CODE- 250/636: Performed by: EMERGENCY MEDICINE

## 2018-01-31 PROCEDURE — 96361 HYDRATE IV INFUSION ADD-ON: CPT | Performed by: EMERGENCY MEDICINE

## 2018-01-31 PROCEDURE — 71260 CT THORAX DX C+: CPT

## 2018-01-31 PROCEDURE — 83690 ASSAY OF LIPASE: CPT | Performed by: EMERGENCY MEDICINE

## 2018-01-31 PROCEDURE — 74011000258 HC RX REV CODE- 258: Performed by: EMERGENCY MEDICINE

## 2018-01-31 RX ORDER — CLINDAMYCIN PHOSPHATE 900 MG/50ML
900 INJECTION INTRAVENOUS
Status: DISCONTINUED | OUTPATIENT
Start: 2018-01-31 | End: 2018-01-31 | Stop reason: SDUPTHER

## 2018-01-31 RX ORDER — ONDANSETRON 8 MG/1
TABLET, ORALLY DISINTEGRATING ORAL
Qty: 15 TAB | Refills: 0 | Status: SHIPPED | OUTPATIENT
Start: 2018-01-31 | End: 2018-02-06

## 2018-01-31 RX ORDER — HYDROCODONE BITARTRATE AND HOMATROPINE METHYLBROMIDE ORAL SOLUTION 5; 1.5 MG/5ML; MG/5ML
5 LIQUID ORAL
Qty: 120 ML | Refills: 0 | Status: SHIPPED | OUTPATIENT
Start: 2018-01-31 | End: 2018-02-06 | Stop reason: ALTCHOICE

## 2018-01-31 RX ORDER — CLINDAMYCIN HYDROCHLORIDE 150 MG/1
300 CAPSULE ORAL EVERY 6 HOURS
Qty: 56 CAP | Refills: 0 | Status: SHIPPED | OUTPATIENT
Start: 2018-01-31 | End: 2018-02-07

## 2018-01-31 RX ORDER — HYDROCODONE BITARTRATE AND HOMATROPINE METHYLBROMIDE 1.5; 5 MG/5ML; MG/5ML
5 SYRUP ORAL
Status: COMPLETED | OUTPATIENT
Start: 2018-01-31 | End: 2018-01-31

## 2018-01-31 RX ORDER — SODIUM CHLORIDE 0.9 % (FLUSH) 0.9 %
10 SYRINGE (ML) INJECTION
Status: COMPLETED | OUTPATIENT
Start: 2018-01-31 | End: 2018-01-31

## 2018-01-31 RX ADMIN — SODIUM CHLORIDE 1000 ML: 900 INJECTION, SOLUTION INTRAVENOUS at 11:12

## 2018-01-31 RX ADMIN — IOPAMIDOL 100 ML: 755 INJECTION, SOLUTION INTRAVENOUS at 12:02

## 2018-01-31 RX ADMIN — HYDROCODONE BITARTRATE AND HOMATROPINE METHYLBROMIDE 5 ML: 5; 1.5 SOLUTION ORAL at 13:44

## 2018-01-31 RX ADMIN — CLINDAMYCIN PHOSPHATE 900 MG: 150 INJECTION, SOLUTION INTRAVENOUS at 13:37

## 2018-01-31 RX ADMIN — SODIUM CHLORIDE 100 ML: 900 INJECTION, SOLUTION INTRAVENOUS at 12:02

## 2018-01-31 RX ADMIN — Medication 10 ML: at 12:02

## 2018-01-31 NOTE — TELEPHONE ENCOUNTER
1/31/2018    Ondina Harris came to the [facility] [address] on Monday 1/29 with fever, chills, and body aches which began the prior Friday. Incidentally, Thursday the day before her flu like symptoms started she reports that she tried to take 4 pills and got choked on them. She aspirated some water but did cough out all 4 pills and was easily able to identify they were all there. The following evening about 24 hours later was when she developed abrupt onset of flu like symptoms. She was in bed all weekend and came in to be evaluated Monday. At that time, her exam revealed a constant coarse cough but no rales or rhonchi. Her rapid influenza test was negative. Vitals stable in the clinic. She was sent for a CXR which showed a possible faint RUL infiltrate. She was started on doxycyline and albuterol and instructed to follow up with her MD if s/sx did not improve. Discussed her care with Dr. Whitney Kyle, he is familiar with the patient as he works closely with her daily. Since she was able to identify all of the pills and did not feel one was retained we determined bronch not to be necessary, nor coverage with clindamycin at that time. Based on her symptoms she was presumed to have influenza but was too late seeking care to benefit from tamiflu. Unfortunately, she has failed to improve. Today I asked her to come in for re-evaluation and in light of her tachypnea and SOB I immediately escorted her via wheelchair to the ER for further workup. Report was given to Phillip Rodríguez and she was left in the ER holding area in stable condition.      MARCO Medina

## 2018-01-31 NOTE — ED PROVIDER NOTES
HPI Comments: Presents with complaint of continued cough and fever since starting antibiotics 3 days ago. Patient reports she aspirated while swallowing pills on 1/25. Patient reports she developed a fever and increasing shortness of breath the next day. She went to work well and was given a prescription for doxycycline for a right-sided infiltrate and has not gotten any better. Patient is a 50 y.o. female presenting with cough. The history is provided by the patient. Cough   This is a new problem. The current episode started more than 2 days ago. The problem occurs constantly. The problem has been gradually worsening. The cough is productive of sputum. There has been a fever of 102 - 102.9 F. Associated symptoms include chills, shortness of breath and wheezing. She has tried antibiotics and inhalers for the symptoms. She is not a smoker. Her past medical history is significant for pneumonia.         Past Medical History:   Diagnosis Date    Allergic rhinitis due to pollen 5/8/2014    Cervical radiculopathy 1/2/2014    C5 and C6    Depression (emotion) 1/2/2014    GERD (gastroesophageal reflux disease)     w/sore throat; neg h.pylori- prilosec controls well    Hypertension     Insomnia     Onychomycosis     Sleep apnea 09/2017    using cpap machine    Thyroid nodule     tsh normal       Past Surgical History:   Procedure Laterality Date    HX BREAST BIOPSY      HX BREAST LUMPECTOMY Left     benign    HX HEART CATHETERIZATION      HX HEENT  10/05/2017    sinus    HX HYSTERECTOMY      Ovaries removed-Since surgery  had fragments of ovary removed after hysterectomy    HX OTHER SURGICAL      left axilla lymph nodes removed         Family History:   Problem Relation Age of Onset    Hypertension Mother     Cancer Father      Lung    No Known Problems Brother     Cancer Paternal Grandmother      breast    Heart Disease Paternal Grandmother     Hypertension Paternal Grandmother     Stroke Paternal Grandmother     Hypertension Maternal Grandmother     Heart Disease Maternal Grandmother     Diabetes Maternal Grandmother     Arthritis-osteo Maternal Grandmother     Hypertension Maternal Grandfather     Heart Disease Maternal Grandfather     Diabetes Maternal Grandfather     Arthritis-osteo Maternal Grandfather     Heart Disease Paternal Grandfather     Hypertension Paternal Grandfather     Stroke Paternal Grandfather     Breast Cancer Paternal Aunt        Social History     Social History    Marital status:      Spouse name: N/A    Number of children: N/A    Years of education: N/A     Occupational History    Not on file. Social History Main Topics    Smoking status: Never Smoker    Smokeless tobacco: Never Used    Alcohol use 0.6 oz/week     1 Glasses of wine per week      Comment: 4 glasses wine/month if that    Drug use: No    Sexual activity: Yes     Partners: Male     Birth control/ protection: Surgical      Comment: Hysterectomy     Other Topics Concern    Caffeine Concern No     1 cup coffee daily    Exercise No    Seat Belt Yes    Self-Exams Yes     Social History Narrative    Denies physical or sexual abuse         ALLERGIES: Flagyl [metronidazole]    Review of Systems   Constitutional: Positive for chills and fever. Respiratory: Positive for cough, shortness of breath and wheezing. All other systems reviewed and are negative. Vitals:    01/31/18 0954   BP: 128/82   Pulse: 88   Resp: 18   Temp: 98.3 °F (36.8 °C)   SpO2: 98%   Weight: 82.6 kg (182 lb)   Height: 5' 6\" (1.676 m)            Physical Exam   Constitutional: She is oriented to person, place, and time. She appears well-developed and well-nourished. No distress. HENT:   Head: Normocephalic and atraumatic. Neck: Normal range of motion. Neck supple. Cardiovascular: Normal rate and regular rhythm. Pulmonary/Chest: Effort normal and breath sounds normal. No respiratory distress.  She has no wheezes. She has no rales. Abdominal: Soft. She exhibits no distension. There is no tenderness. There is no rebound. Neurological: She is alert and oriented to person, place, and time. No cranial nerve deficit. Skin: Skin is warm and dry. No rash noted. She is not diaphoretic. No erythema. Psychiatric: She has a normal mood and affect. Her behavior is normal.   Nursing note and vitals reviewed. MDM  Number of Diagnoses or Management Options  Aspiration pneumonia of right lower lobe, unspecified aspiration pneumonia type [facility]):   Diagnosis management comments: The patient looks well vital signs all normal, lactic, procalcitonin and white count negative. Given her his history of aspiration ? Retained pill. CT done. Infiltrate c/w aspiration. D/w pulm Dr. Armstrong Bending and recommended continue doxy, add clinda. F/u in 2 days.        Amount and/or Complexity of Data Reviewed  Clinical lab tests: ordered and reviewed  Review and summarize past medical records: yes  Discuss the patient with other providers: yes    Risk of Complications, Morbidity, and/or Mortality  Presenting problems: high  Diagnostic procedures: moderate  Management options: moderate    Patient Progress  Patient progress: improved        ED Course       Procedures

## 2018-01-31 NOTE — DISCHARGE INSTRUCTIONS
Aspiration Pneumonia: Care Instructions  Your Care Instructions    Aspiration pneumonia is an inflammation of the lungs. It may occur after you breathe in large amounts of foreign material, such as food, liquid, vomit, or mucus. Aspiration may happen because of a health problem that makes it hard to swallow. These problems include stroke or seizure. Pneumonia makes it hard to breathe. Follow-up care is a key part of your treatment and safety. Be sure to make and go to all appointments, and call your doctor if you are having problems. It's also a good idea to know your test results and keep a list of the medicines you take. How can you care for yourself at home? To help with swallowing  · You may need to do exercises to train your muscles to work together to help you swallow. You may also need to learn how to position your body or how to put food in your mouth to be able to swallow better. · You may need to change the foods you eat. Your doctor may tell you to eat certain foods and liquids to make swallowing easier. · You may need to change how you prepare foods. For example, you may need to add thickeners to some liquids, or puree certain foods in a . To help with pneumonia  · Take your antibiotics as directed. Do not stop taking them just because you feel better. You need to take the full course of antibiotics. · Take your medicines exactly as prescribed. For example, your doctor may have given you medicine that makes breathing easier. Call your doctor if you think you are having a problem with your medicine. · Get plenty of rest and sleep. You may feel weak and tired for a while, but your energy level will improve with time. · Take care of your cough so you can rest. A cough that brings up mucus from your lungs is common with pneumonia. It is one way your body gets rid of the infection. But if coughing keeps you from resting or causes severe fatigue and chest-wall pain, talk to your doctor. He or she may suggest that you take a medicine to reduce the cough. · Use a humidifier to increase the moisture in the air. Dry air makes coughing worse. Follow the instructions for cleaning the machine. · Do not smoke, and avoid others' smoke. Smoke will make your cough last longer. If you need help quitting, talk to your doctor about stop-smoking programs and medicines. These can increase your chances of quitting for good. · Take an over-the-counter pain medicine, such as acetaminophen (Tylenol), ibuprofen (Advil, Motrin), or naproxen (Aleve) to help reduce fever and reduce chest pain caused by coughing. Read and follow all instructions on the label. · Do not take two or more pain medicines at the same time unless the doctor told you to. Many pain medicines have acetaminophen, which is Tylenol. Too much acetaminophen (Tylenol) can be harmful. When should you call for help? Call 911 anytime you think you may need emergency care. For example, call if:  ? · You have severe trouble breathing. ?Call your doctor now or seek immediate medical care if:  ? · You have a new or higher fever. ? · You have new or worse trouble breathing. ? · You cough up blood. ? · You are dizzy or lightheaded, or you feel like you may faint. ? Watch closely for changes in your health, and be sure to contact your doctor if:  ? · You do not get better as expected. ? · You are coughing more deeply or more often. Where can you learn more? Go to http://serge-sae.info/. Enter 35799 [id number] in the search box to learn more about \"Aspiration Pneumonia: Care Instructions. \"  Current as of: May 12, 2017  Content Version: 11.4  © 3026-4407 [employer]. Care instructions adapted under license by [employer] (which disclaims liability or warranty for this information).  If you have questions about a medical condition or this instruction, always ask your healthcare professional. [employer] disclaims any warranty or liability for your use of this information.

## 2018-01-31 NOTE — ED TRIAGE NOTES
Pt started with flu like symptoms Friday but tested negative. Pt had a chest x ray Monday. Pt states still having body aches, cough and vomiting.

## 2018-01-31 NOTE — ED NOTES
I have reviewed discharge instructions with the patient. The patient verbalized understanding. Patient left ED via Discharge Method: ambulatory to Home with spouse    Opportunity for questions and clarification provided. Patient given 3 scripts. To continue your aftercare when you leave the hospital, you may receive an automated call from our care team to check in on how you are doing. This is a free service and part of our promise to provide the best care and service to meet your aftercare needs.  If you have questions, or wish to unsubscribe from this service please call [phone]. Thank you for Choosing our [facility] Emergency Department.

## 2018-05-10 PROBLEM — F51.01 PRIMARY INSOMNIA: Status: ACTIVE | Noted: 2018-05-10

## 2018-05-10 PROBLEM — J30.89 NON-SEASONAL ALLERGIC RHINITIS: Status: ACTIVE | Noted: 2018-05-10

## 2018-06-25 ENCOUNTER — HOSPITAL ENCOUNTER (OUTPATIENT)
Dept: GENERAL RADIOLOGY | Age: 48
Discharge: HOME | End: 2018-06-25
Payer: COMMERCIAL

## 2018-06-25 ENCOUNTER — HOSPITAL ENCOUNTER (OUTPATIENT)
Dept: LAB | Age: 48
Discharge: HOME | End: 2018-06-25
Payer: COMMERCIAL

## 2018-06-25 DIAGNOSIS — R06.02 SHORTNESS OF BREATH: ICD-10-CM

## 2018-06-25 LAB
BASOPHILS # BLD: 0.1 K/UL (ref 0–0.2)
BASOPHILS NFR BLD: 1 % (ref 0–2)
BNP SERPL-MCNC: 19 PG/ML
DIFFERENTIAL METHOD BLD: ABNORMAL
EOSINOPHIL # BLD: 0.2 K/UL (ref 0–0.8)
EOSINOPHIL NFR BLD: 4 % (ref 0.5–7.8)
ERYTHROCYTE [DISTWIDTH] IN BLOOD BY AUTOMATED COUNT: 12.4 % (ref 11.9–14.6)
HCT VFR BLD AUTO: 37.1 % (ref 35.8–46.3)
HGB BLD-MCNC: 12.7 G/DL (ref 11.7–15.4)
LYMPHOCYTES # BLD: 1.9 K/UL (ref 0.5–4.6)
LYMPHOCYTES NFR BLD: 32 % (ref 13–44)
MCH RBC QN AUTO: 29.8 PG (ref 26.1–32.9)
MCHC RBC AUTO-ENTMCNC: 34.2 G/DL (ref 31.4–35)
MCV RBC AUTO: 87.1 FL (ref 79.6–97.8)
MONOCYTES # BLD: 0.4 K/UL (ref 0.1–1.3)
MONOCYTES NFR BLD: 7 % (ref 4–12)
NEUTS SEG # BLD: 3.4 K/UL (ref 1.7–8.2)
NEUTS SEG NFR BLD: 56 % (ref 43–78)
PLATELET # BLD AUTO: 274 K/UL (ref 150–450)
PMV BLD AUTO: 9.3 FL (ref 10.8–14.1)
RBC # BLD AUTO: 4.26 M/UL (ref 4.05–5.25)
WBC # BLD AUTO: 6 K/UL (ref 4.3–11.1)

## 2018-06-25 PROCEDURE — 71046 X-RAY EXAM CHEST 2 VIEWS: CPT

## 2018-06-25 PROCEDURE — 85025 COMPLETE CBC W/AUTO DIFF WBC: CPT | Performed by: INTERNAL MEDICINE

## 2018-06-25 PROCEDURE — 36415 COLL VENOUS BLD VENIPUNCTURE: CPT | Performed by: INTERNAL MEDICINE

## 2018-06-25 PROCEDURE — 83880 ASSAY OF NATRIURETIC PEPTIDE: CPT | Performed by: INTERNAL MEDICINE

## 2018-06-26 NOTE — PROGRESS NOTES
Please call patient. Labs show a normal BNP suggestive of no evidence of congestive heart failure. CBC was normal showing no significant anemia. Chest x-ray was also normal without any significant abnormalities noted.   Thank you

## 2018-08-07 ENCOUNTER — HOSPITAL ENCOUNTER (OUTPATIENT)
Dept: MAMMOGRAPHY | Age: 48
Discharge: HOME | End: 2018-08-07
Attending: OBSTETRICS & GYNECOLOGY
Payer: COMMERCIAL

## 2018-08-07 DIAGNOSIS — Z12.31 VISIT FOR SCREENING MAMMOGRAM: ICD-10-CM

## 2018-08-07 PROCEDURE — 77067 SCR MAMMO BI INCL CAD: CPT

## 2018-08-09 ENCOUNTER — HOSPITAL ENCOUNTER (OUTPATIENT)
Dept: MAMMOGRAPHY | Age: 48
Discharge: HOME | End: 2018-08-09
Attending: OBSTETRICS & GYNECOLOGY
Payer: COMMERCIAL

## 2018-08-09 DIAGNOSIS — R92.8 ABNORMAL SCREENING MAMMOGRAM: ICD-10-CM

## 2018-08-09 DIAGNOSIS — Z80.3 FH: BREAST CANCER: ICD-10-CM

## 2018-08-09 PROCEDURE — 77065 DX MAMMO INCL CAD UNI: CPT

## 2019-08-14 ENCOUNTER — HOSPITAL ENCOUNTER (OUTPATIENT)
Dept: MAMMOGRAPHY | Age: 49
Discharge: HOME | End: 2019-08-14
Attending: OBSTETRICS & GYNECOLOGY
Payer: COMMERCIAL

## 2019-08-14 DIAGNOSIS — Z12.39 BREAST SCREENING, UNSPECIFIED: ICD-10-CM

## 2019-08-14 PROCEDURE — 77067 SCR MAMMO BI INCL CAD: CPT

## 2019-11-15 ENCOUNTER — HOSPITAL ENCOUNTER (OUTPATIENT)
Dept: LAB | Age: 49
Discharge: HOME | End: 2019-11-15

## 2019-11-15 PROCEDURE — 88305 TISSUE EXAM BY PATHOLOGIST: CPT

## 2020-03-13 ENCOUNTER — HOSPITAL ENCOUNTER (OUTPATIENT)
Dept: MAMMOGRAPHY | Age: 50
Discharge: HOME | End: 2020-03-13
Attending: OBSTETRICS & GYNECOLOGY
Payer: COMMERCIAL

## 2020-03-13 DIAGNOSIS — N63.0 BREAST LUMP: ICD-10-CM

## 2020-03-13 DIAGNOSIS — N63.10 BREAST MASS, RIGHT: ICD-10-CM

## 2020-03-13 PROCEDURE — 76642 ULTRASOUND BREAST LIMITED: CPT

## 2020-03-13 PROCEDURE — 77065 DX MAMMO INCL CAD UNI: CPT

## 2020-09-01 ENCOUNTER — HOSPITAL ENCOUNTER (OUTPATIENT)
Dept: MAMMOGRAPHY | Age: 50
Discharge: HOME | End: 2020-09-01
Attending: OBSTETRICS & GYNECOLOGY
Payer: COMMERCIAL

## 2020-09-01 DIAGNOSIS — Z12.31 OTHER SCREENING MAMMOGRAM: ICD-10-CM

## 2020-09-01 PROCEDURE — 77063 BREAST TOMOSYNTHESIS BI: CPT

## 2021-01-19 PROBLEM — R06.02 SHORTNESS OF BREATH: Status: RESOLVED | Noted: 2017-01-09 | Resolved: 2021-01-19

## 2021-02-01 PROBLEM — E04.1 THYROID NODULE: Status: ACTIVE | Noted: 2021-02-01

## 2021-07-19 PROBLEM — E55.9 VITAMIN D DEFICIENCY: Status: ACTIVE | Noted: 2021-07-19

## 2021-10-12 ENCOUNTER — APPOINTMENT (OUTPATIENT)
Dept: GENERAL RADIOLOGY | Age: 51
End: 2021-10-12
Attending: EMERGENCY MEDICINE
Payer: COMMERCIAL

## 2021-10-12 ENCOUNTER — HOSPITAL ENCOUNTER (EMERGENCY)
Age: 51
Discharge: HOME | End: 2021-10-12
Attending: EMERGENCY MEDICINE
Payer: COMMERCIAL

## 2021-10-12 VITALS
OXYGEN SATURATION: 98 % | HEIGHT: 65 IN | HEART RATE: 74 BPM | SYSTOLIC BLOOD PRESSURE: 109 MMHG | BODY MASS INDEX: 29.32 KG/M2 | DIASTOLIC BLOOD PRESSURE: 71 MMHG | WEIGHT: 176 LBS | RESPIRATION RATE: 17 BRPM | TEMPERATURE: 97.9 F

## 2021-10-12 DIAGNOSIS — U07.1 COVID-19 VIRUS INFECTION: Primary | ICD-10-CM

## 2021-10-12 LAB
ALBUMIN SERPL-MCNC: 3 G/DL (ref 3.5–5)
ALBUMIN/GLOB SERPL: 0.8 {RATIO} (ref 1.2–3.5)
ALP SERPL-CCNC: 100 U/L (ref 50–136)
ALT SERPL-CCNC: 23 U/L (ref 12–65)
ANION GAP SERPL CALC-SCNC: 7 MMOL/L (ref 7–16)
AST SERPL-CCNC: 22 U/L (ref 15–37)
BASOPHILS # BLD: 0 K/UL (ref 0–0.2)
BASOPHILS NFR BLD: 0 % (ref 0–2)
BILIRUB SERPL-MCNC: 0.4 MG/DL (ref 0.2–1.1)
BUN SERPL-MCNC: 9 MG/DL (ref 6–23)
CALCIUM SERPL-MCNC: 8.5 MG/DL (ref 8.3–10.4)
CHLORIDE SERPL-SCNC: 106 MMOL/L (ref 98–107)
CO2 SERPL-SCNC: 27 MMOL/L (ref 21–32)
COVID-19 RAPID TEST, COVR: DETECTED
CREAT SERPL-MCNC: 0.7 MG/DL (ref 0.6–1)
DIFFERENTIAL METHOD BLD: ABNORMAL
EOSINOPHIL # BLD: 0.2 K/UL (ref 0–0.8)
EOSINOPHIL NFR BLD: 5 % (ref 0.5–7.8)
ERYTHROCYTE [DISTWIDTH] IN BLOOD BY AUTOMATED COUNT: 12.1 % (ref 11.9–14.6)
GLOBULIN SER CALC-MCNC: 4 G/DL (ref 2.3–3.5)
GLUCOSE SERPL-MCNC: 91 MG/DL (ref 65–100)
HCT VFR BLD AUTO: 37.8 % (ref 35.8–46.3)
HGB BLD-MCNC: 12.7 G/DL (ref 11.7–15.4)
IMM GRANULOCYTES # BLD AUTO: 0 K/UL (ref 0–0.5)
IMM GRANULOCYTES NFR BLD AUTO: 1 % (ref 0–5)
LYMPHOCYTES # BLD: 1.5 K/UL (ref 0.5–4.6)
LYMPHOCYTES NFR BLD: 31 % (ref 13–44)
MCH RBC QN AUTO: 29.4 PG (ref 26.1–32.9)
MCHC RBC AUTO-ENTMCNC: 33.6 G/DL (ref 31.4–35)
MCV RBC AUTO: 87.5 FL (ref 79.6–97.8)
MONOCYTES # BLD: 0.2 K/UL (ref 0.1–1.3)
MONOCYTES NFR BLD: 5 % (ref 4–12)
NEUTS SEG # BLD: 2.8 K/UL (ref 1.7–8.2)
NEUTS SEG NFR BLD: 59 % (ref 43–78)
NRBC # BLD: 0 K/UL (ref 0–0.2)
PLATELET # BLD AUTO: 283 K/UL (ref 150–450)
PMV BLD AUTO: 9.2 FL (ref 9.4–12.3)
POTASSIUM SERPL-SCNC: 3.5 MMOL/L (ref 3.5–5.1)
PROT SERPL-MCNC: 7 G/DL (ref 6.3–8.2)
RBC # BLD AUTO: 4.32 M/UL (ref 4.05–5.2)
SODIUM SERPL-SCNC: 140 MMOL/L (ref 136–145)
SOURCE, COVRS: ABNORMAL
WBC # BLD AUTO: 4.7 K/UL (ref 4.3–11.1)

## 2021-10-12 PROCEDURE — 74011250636 HC RX REV CODE- 250/636: Performed by: EMERGENCY MEDICINE

## 2021-10-12 PROCEDURE — 96360 HYDRATION IV INFUSION INIT: CPT

## 2021-10-12 PROCEDURE — 74011000258 HC RX REV CODE- 258: Performed by: EMERGENCY MEDICINE

## 2021-10-12 PROCEDURE — 96361 HYDRATE IV INFUSION ADD-ON: CPT

## 2021-10-12 PROCEDURE — 80053 COMPREHEN METABOLIC PANEL: CPT

## 2021-10-12 PROCEDURE — 71045 X-RAY EXAM CHEST 1 VIEW: CPT

## 2021-10-12 PROCEDURE — 74011000636 HC RX REV CODE- 636: Performed by: EMERGENCY MEDICINE

## 2021-10-12 PROCEDURE — 87635 SARS-COV-2 COVID-19 AMP PRB: CPT

## 2021-10-12 PROCEDURE — 85025 COMPLETE CBC W/AUTO DIFF WBC: CPT

## 2021-10-12 PROCEDURE — M0243 CASIRIVI AND IMDEVI INFUSION: HCPCS

## 2021-10-12 PROCEDURE — 99283 EMERGENCY DEPT VISIT LOW MDM: CPT

## 2021-10-12 RX ADMIN — CASIRIVIMAB AND IMDEVIMAB: 600; 600 INJECTION, SOLUTION, CONCENTRATE INTRAVENOUS at 13:27

## 2021-10-12 RX ADMIN — SODIUM CHLORIDE 1000 ML: 900 INJECTION, SOLUTION INTRAVENOUS at 11:47

## 2021-10-12 NOTE — ED TRIAGE NOTES
\"I have been having fever, chills, weakness, no taste no smell. It started Thursday, I started coughing. I have been a little dizzy.   Friday night was a really bad night\"

## 2021-10-12 NOTE — DISCHARGE INSTRUCTIONS
Closely monitor your oxygen saturation with your pulse oximeter. Return to ED if oxygen saturation less than 90% room air. Close follow-up primary care physician. Return to ED if symptoms worsen or progress in any way.

## 2021-10-12 NOTE — ED PROVIDER NOTES
68-year-old female with history of GERD, hypertension presents with complaint of fever, chills, myalgias, productive cough with clear sputum, rhinorrhea, nasal congestion, loss of sense of taste and smell since Thursday. Patient states that she received both of her [employer] COVID-19 vaccines in February. 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 Patient states that her  tested positive last Sunday but had improvement and resolution of his symptoms. States that her symptoms did not start until Thursday. Denies chest pain, shortness of breath, wheezing, nausea, vomiting. Patient reports of episodes of loose stool. Denies tobacco use. Rates symptoms as moderate. States that she is been taking TheraFlu at home without improvement in symptoms. The history is provided by the patient. No  was used.         Past Medical History:   Diagnosis Date    Allergic rhinitis due to pollen     Cervical radiculopathy     GERD (gastroesophageal reflux disease)     Hypertension     Insomnia     Major depression     Obstructive sleep apnea on CPAP     Onychomycosis     Thyroid nodule        Past Surgical History:   Procedure Laterality Date    HX BREAST BIOPSY Left     HX BREAST LUMPECTOMY Left     benign    HX COLONOSCOPY      11/19    HX HEART CATHETERIZATION      HX HEENT      sinus surgery x 3    HX LEFT SALPINGO-OOPHORECTOMY  1990s    HX LYMPHADENECTOMY      left axilla lymph nodes    HX RIGHT SALPINGO-OOPHORECTOMY  1998    HX TOTAL ABDOMINAL HYSTERECTOMY  1998         Family History:   Problem Relation Age of Onset    Hypertension Mother     Lung Cancer Father     No Known Problems Brother     Heart Disease Paternal Grandmother     Hypertension Paternal Grandmother     Stroke Paternal Grandmother     Breast Cancer Paternal Grandmother     Bilateral breast cancer Paternal Grandmother     Hypertension Maternal Grandmother     Heart Disease Maternal Grandmother     Diabetes Maternal Grandmother     Arthritis-osteo Maternal Grandmother     Hypertension Maternal Grandfather     Heart Disease Maternal Grandfather     Diabetes Maternal Grandfather     Arthritis-osteo Maternal Grandfather     Heart Disease Paternal Grandfather     Hypertension Paternal Grandfather     Stroke Paternal Grandfather     Breast Cancer Paternal Aunt     Thyroid Cancer Neg Hx        Social History     Socioeconomic History    Marital status:      Spouse name: Not on file    Number of children: Not on file    Years of education: Not on file    Highest education level: Not on file   Occupational History    Not on file   Tobacco Use    Smoking status: Never Smoker    Smokeless tobacco: Never Used   Vaping Use    Vaping Use: Never used   Substance and Sexual Activity    Alcohol use: Yes     Alcohol/week: 1.0 standard drinks     Types: 1 Glasses of wine per week     Comment: 4 glasses wine/month if that    Drug use: No    Sexual activity: Yes     Partners: Male     Birth control/protection: Surgical     Comment: Hysterectomy   Other Topics Concern     Service Not Asked    Blood Transfusions Not Asked    Caffeine Concern No     Comment: 1 cup coffee daily    Occupational Exposure Not Asked    Hobby Hazards Not Asked    Sleep Concern Not Asked    Stress Concern Not Asked    Weight Concern Not Asked    Special Diet Not Asked    Back Care Not Asked    Exercise No    Bike Helmet Not Asked    Seat Belt Yes    Self-Exams Yes   Social History Narrative    Denies physical or sexual abuse     Social Determinants of Health     Financial Resource Strain:     Difficulty of Paying Living Expenses:    Food Insecurity:     Worried About Running Out of Food in the Last Year:     Ran Out of Food in the Last Year:    Transportation Needs:     Lack of Transportation (Medical):      Lack of Transportation (Non-Medical):    Physical Activity:     Days of Exercise per Week:     Minutes of Exercise per Session:    Stress:     Feeling of Stress :    Social Connections:     Frequency of Communication with Friends and Family:     Frequency of Social Gatherings with Friends and Family:     Attends [employer] Services:     Active Member of Clubs or Organizations:     Attends Club or Organization Meetings:     Marital Status:    Intimate Partner Violence:     Fear of Current or Ex-Partner:     Emotionally Abused:     Physically Abused:     Sexually Abused: ALLERGIES: Flagyl [metronidazole]    Review of Systems   Constitutional: Positive for chills, fatigue and fever. HENT: Positive for congestion and rhinorrhea. Negative for sore throat, trouble swallowing and voice change. Respiratory: Positive for cough and shortness of breath. Negative for wheezing and stridor. Cardiovascular: Negative for chest pain and palpitations. Gastrointestinal: Positive for diarrhea. Negative for abdominal distention, abdominal pain, constipation, nausea and vomiting. Genitourinary: Negative for dysuria and flank pain. Musculoskeletal: Positive for myalgias. Negative for arthralgias and neck stiffness. Skin: Negative for color change and rash. Neurological: Negative for dizziness, syncope, weakness, light-headedness and headaches. Hematological: Does not bruise/bleed easily. Psychiatric/Behavioral: Negative for confusion. Vitals:    10/12/21 1104 10/12/21 1146   BP: 123/75    Pulse: 82    Resp: 18    Temp: 97.9 °F (36.6 °C)    SpO2: 96% 97%   Weight: 79.8 kg (176 lb)    Height: 5' 5\" (1.651 m)             Physical Exam  Vitals and nursing note reviewed. Constitutional:       Appearance: Normal appearance. HENT:      Head: Normocephalic. Nose: Congestion present. Mouth/Throat:      Mouth: Mucous membranes are moist.   Eyes:      Extraocular Movements: Extraocular movements intact.       Pupils: Pupils are equal, round, and reactive to light. Cardiovascular:      Rate and Rhythm: Normal rate. Pulses: Normal pulses. Heart sounds: Normal heart sounds. Pulmonary:      Effort: Pulmonary effort is normal.      Breath sounds: Normal breath sounds. Comments: CTAB. Abdominal:      General: Bowel sounds are normal.      Palpations: Abdomen is soft. Tenderness: There is no abdominal tenderness. There is no guarding or rebound. Comments: Soft, nontender, nondistended. No rebound or guarding. Musculoskeletal:         General: No swelling. Normal range of motion. Cervical back: No rigidity. Skin:     General: Skin is warm. Findings: No erythema or rash. Neurological:      General: No focal deficit present. Mental Status: She is alert and oriented to person, place, and time. Cranial Nerves: No cranial nerve deficit. Motor: No weakness. MDM  Number of Diagnoses or Management Options  Diagnosis management comments: Vital signs stable. Patient ambulated with no desat. Chest x-ray with no acute or concerning findings noted. Rapid COVID-19 swab ordered. Discussed treatment with Regeneron if she tested positive for COVID-19. Discussed risk/benefits. Patient in agreement to treatment with Regeneron. Patient rapid Covid testing positive  BMI greater than 25 and therefore candidate for receiving Regeneron. Patient instructed to follow-up with PCP and given strict return precautions. Instructed to follow CDC/DHEC guidelines in regards to self-isolation/quarantine.        Amount and/or Complexity of Data Reviewed  Clinical lab tests: ordered and reviewed  Tests in the radiology section of CPT®: ordered and reviewed  Tests in the medicine section of CPT®: ordered and reviewed  Review and summarize past medical records: yes  Independent visualization of images, tracings, or specimens: yes    Risk of Complications, Morbidity, and/or Mortality  Presenting problems: moderate  Diagnostic procedures: moderate  Management options: moderate    Patient Progress  Patient progress: stable    ED Course as of Oct 12 1219   Tue Oct 12, 2021   1145 CXRFindings: A single view of the chest was obtained at 11:15 AM.     The cardiac silhouette is normal in size and configuration. The lungs and  pleural spaces are clear. The pulmonary vascularity is within normal limits.     IMPRESSION:   Unremarkable portable chest radiograph. [DF]   1535 COVID-19 rapid test(!!): Detected [DF]      ED Course User Index  [DF] Brad Westbrook MD       Procedures               Results Include:    Recent Results (from the past 24 hour(s))   CBC WITH AUTOMATED DIFF    Collection Time: 10/12/21 11:09 AM   Result Value Ref Range    WBC 4.7 4.3 - 11.1 K/uL    RBC 4.32 4.05 - 5.2 M/uL    HGB 12.7 11.7 - 15.4 g/dL    HCT 37.8 35.8 - 46.3 %    MCV 87.5 79.6 - 97.8 FL    MCH 29.4 26.1 - 32.9 PG    MCHC 33.6 31.4 - 35.0 g/dL    RDW 12.1 11.9 - 14.6 %    PLATELET 766 577 - 001 K/uL    MPV 9.2 (L) 9.4 - 12.3 FL    ABSOLUTE NRBC 0.00 0.0 - 0.2 K/uL    DF AUTOMATED      NEUTROPHILS 59 43 - 78 %    LYMPHOCYTES 31 13 - 44 %    MONOCYTES 5 4.0 - 12.0 %    EOSINOPHILS 5 0.5 - 7.8 %    BASOPHILS 0 0.0 - 2.0 %    IMMATURE GRANULOCYTES 1 0.0 - 5.0 %    ABS. NEUTROPHILS 2.8 1.7 - 8.2 K/UL    ABS. LYMPHOCYTES 1.5 0.5 - 4.6 K/UL    ABS. MONOCYTES 0.2 0.1 - 1.3 K/UL    ABS. EOSINOPHILS 0.2 0.0 - 0.8 K/UL    ABS. BASOPHILS 0.0 0.0 - 0.2 K/UL    ABS. IMM. GRANS. 0.0 0.0 - 0.5 K/UL     Carlos Toney MD; 10/12/2021 @11:49 AM Voice dictation software was used during the making of this note. This software is not perfect and grammatical and other typographical errors may be present.   This note has not been proofread for errors.  ===================================================================

## 2021-10-12 NOTE — ED NOTES
I have reviewed discharge instructions with the patient. The patient verbalized understanding. Patient left ED via Discharge Method: ambulatory to Home with self    Opportunity for questions and clarification provided. Patient given 0 scripts. To continue your aftercare when you leave the hospital, you may receive an automated call from our care team to check in on how you are doing. This is a free service and part of our promise to provide the best care and service to meet your aftercare needs.  If you have questions, or wish to unsubscribe from this service please call [phone]. Thank you for Choosing our [address] [place] [address] Emergency Department.

## 2021-10-13 ENCOUNTER — PATIENT OUTREACH (OUTPATIENT)
Dept: OTHER | Age: 51
End: 2021-10-13

## 2021-10-13 NOTE — PROGRESS NOTES
Patient contacted regarding COVID-19 diagnosis, pulse oximeter ordered at discharge, monoclonal antibody infusion follow up. Discussed COVID-19 related testing which was available at this time. Test results were positive. Patient informed of results, if available? yes. LPN Care Coordinator contacted the patient by telephone to perform post discharge assessment. Call within 2 business days of discharge: Yes Verified name and  with patient as identifiers. Provided introduction to self, and explanation of the CTN/ACM role, and reason for call due to risk factors for infection and/or exposure to COVID-19. Symptoms reviewed with patient who verbalized the following symptoms: fatigue, cough, shortness of breath and loss or taste or smell   Patient reports she had slept better last night and feels a little better today. States O2 Sat's are around 94% and do not drop with activity. Patient able to speak in full sentences with no SOB noted. Taking Tylenol/Advil for aches and fever. Reports no fever today with Advil. No new or worsening symptoms. Due to no new or worsening symptoms encounter was not routed to provider for escalation. Discussed follow-up appointments. If no appointment was previously scheduled, appointment scheduling offered:  Patient to call and schedule with PCP. [address] follow up appointment(s):   Future Appointments   Date Time Provider James Echeverria   2022  8:00 AM Wilmer SMITH NP [facility]   2022  8:15 AM Aric Engel MD [facility] CM[facility]       Interventions to address risk factors: Education of patient/family/caregiver/guardian to support self-management-advised to monitor O2 Sats frequently and return to ER for Sat's less than 90 that do not rebound or worsening SOB. also dicussed improtance of frequently ambulating in home and deep breathing, staying hydrated and schduling follow up.      Advance Care Planning:   Does patient have an Advance Directive: not addressed at this call. Educated patient about risk for severe COVID-19 due to risk factors according to CDC guidelines. LPN CC reviewed discharge instructions, medical action plan and red flag symptoms with the patient who verbalized understanding. Discussed COVID vaccination status: yes. Patient is fully vaccinated. Discussed exposure protocols and quarantine with CDC Guidelines. Patient was given an opportunity to verbalize any questions and concerns and agrees to contact LPN CC or health care provider for questions related to their healthcare. Reviewed and educated patient on any new and changed medications related to discharge diagnosis     Was patient discharged with a pulse oximeter? yes Discussed and confirmed pulse oximeter discharge instructions and when to notify provider or seek emergency care. LPN CC provided contact information. Plan for follow-up call in 5-7 days based on severity of symptoms and risk factors.

## 2021-10-19 ENCOUNTER — TRANSCRIBE ORDER (OUTPATIENT)
Dept: SCHEDULING | Age: 51
End: 2021-10-19

## 2021-10-19 DIAGNOSIS — Z12.31 SCREENING MAMMOGRAM FOR HIGH-RISK PATIENT: Primary | ICD-10-CM

## 2021-10-20 ENCOUNTER — PATIENT OUTREACH (OUTPATIENT)
Dept: OTHER | Age: 51
End: 2021-10-20

## 2021-10-20 NOTE — PROGRESS NOTES
Date/Time:  10/20/2021 11:18 AM  Attempted to reach patient by telephone. Left HIPPA compliant message requesting a return call. Will attempt to reach patient again.

## 2021-10-27 ENCOUNTER — HOSPITAL ENCOUNTER (OUTPATIENT)
Dept: MAMMOGRAPHY | Age: 51
Discharge: HOME | End: 2021-10-27
Attending: OBSTETRICS & GYNECOLOGY
Payer: COMMERCIAL

## 2021-10-27 DIAGNOSIS — Z12.31 SCREENING MAMMOGRAM FOR HIGH-RISK PATIENT: ICD-10-CM

## 2021-10-27 PROCEDURE — 77063 BREAST TOMOSYNTHESIS BI: CPT

## 2021-11-03 ENCOUNTER — HOSPITAL ENCOUNTER (OUTPATIENT)
Dept: MAMMOGRAPHY | Age: 51
Discharge: HOME | End: 2021-11-03
Attending: OBSTETRICS & GYNECOLOGY
Payer: COMMERCIAL

## 2021-11-03 ENCOUNTER — PATIENT OUTREACH (OUTPATIENT)
Dept: OTHER | Age: 51
End: 2021-11-03

## 2021-11-03 DIAGNOSIS — R92.8 ABNORMAL SCREENING MAMMOGRAM: ICD-10-CM

## 2021-11-03 PROCEDURE — 76642 ULTRASOUND BREAST LIMITED: CPT

## 2021-11-03 NOTE — PROGRESS NOTES
Patient resolved from Transition of Care episode on 11/3/2021  Discussed COVID-19 related testing which was available at this time. Test results were positive. Patient informed of results, if available? yes     Patient/family has been provided the following resources and education related to COVID-19:                         Signs, symptoms and red flags related to COVID-19            [facility] exposure and quarantine guidelines            [employer]it exposure contact - [phone]            Contact for their local Department of Health                 Patient currently reports that the following symptoms have improved:  Patient reports symptoms have all resolved, exept for mild residual fatigue. PAtient has returned to work and usual activities. No further CM needs, will resolve this episode. .    No further outreach scheduled with this LPN,CC. Episode of Care resolved. Patient has this LPN, CC contact information if future needs arise.

## 2022-03-18 PROBLEM — F51.01 PRIMARY INSOMNIA: Status: ACTIVE | Noted: 2018-05-10

## 2022-03-18 PROBLEM — J30.89 NON-SEASONAL ALLERGIC RHINITIS: Status: ACTIVE | Noted: 2018-05-10

## 2022-03-19 PROBLEM — K21.9 GASTROESOPHAGEAL REFLUX DISEASE WITHOUT ESOPHAGITIS: Status: ACTIVE | Noted: 2017-04-04

## 2022-03-19 PROBLEM — E55.9 VITAMIN D DEFICIENCY: Status: ACTIVE | Noted: 2021-07-19

## 2022-03-19 PROBLEM — E04.1 THYROID NODULE: Status: ACTIVE | Noted: 2021-02-01

## 2022-07-06 ENCOUNTER — OFFICE VISIT (OUTPATIENT)
Dept: GYNECOLOGY | Age: 52
End: 2022-07-06
Payer: COMMERCIAL

## 2022-07-06 VITALS
SYSTOLIC BLOOD PRESSURE: 102 MMHG | HEIGHT: 65 IN | WEIGHT: 184 LBS | DIASTOLIC BLOOD PRESSURE: 68 MMHG | BODY MASS INDEX: 30.66 KG/M2

## 2022-07-06 DIAGNOSIS — Z12.31 VISIT FOR SCREENING MAMMOGRAM: ICD-10-CM

## 2022-07-06 DIAGNOSIS — Z78.0 MENOPAUSE: ICD-10-CM

## 2022-07-06 DIAGNOSIS — Z01.419 WELL WOMAN EXAM WITH ROUTINE GYNECOLOGICAL EXAM: Primary | ICD-10-CM

## 2022-07-06 PROCEDURE — 99396 PREV VISIT EST AGE 40-64: CPT | Performed by: OBSTETRICS & GYNECOLOGY

## 2022-07-06 RX ORDER — ESTRADIOL 2 MG/1
2 TABLET ORAL DAILY
Qty: 90 TABLET | Refills: 4 | Status: SHIPPED | OUTPATIENT
Start: 2022-07-06 | End: 2022-09-27 | Stop reason: SDUPTHER

## 2022-07-06 NOTE — PROGRESS NOTES
LYMPHADENECTOMY      left axilla lymph nodes    SALPINGO-OOPHORECTOMY      SALPINGO-OOPHORECTOMY  1998     Family History   Problem Relation Age of Onset    Hypertension Paternal Grandfather     Stroke Paternal Grandfather     Breast Cancer Paternal Aunt     Thyroid Cancer Neg Hx     Hypertension Mother     Lung Cancer Father     No Known Problems Brother     Heart Disease Paternal Grandmother     Hypertension Paternal Grandmother     Stroke Paternal Grandmother     Breast Cancer Paternal Grandmother     Bilateral breast cancer Paternal Grandmother     Hypertension Maternal Grandmother     Heart Disease Maternal Grandmother     Diabetes Maternal Grandmother     Osteoarthritis Maternal Grandmother     Hypertension Maternal Grandfather     Heart Disease Maternal Grandfather     Diabetes Maternal Grandfather     Osteoarthritis Maternal Grandfather     Heart Disease Paternal Grandfather       Social History     Tobacco Use    Smoking status: Never Smoker    Smokeless tobacco: Never Used   Substance Use Topics    Alcohol use: Yes     Alcohol/week: 1.0 standard drink     Comment: occ       Social History     Substance and Sexual Activity   Sexual Activity Yes    Birth control/protection: Surgical    Comment: Hysterectomy     OB History    Para Term  AB Living   2 2 0 0 0 0   SAB IAB Ectopic Molar Multiple Live Births   0 0 0 0 0 0      # Outcome Date GA Lbr Kamlesh/2nd Weight Sex Delivery Anes PTL Lv   2 Para            1 Para               Obstetric Comments   Vaginal        Health Maintenance  Mammogram: 10-27-21  Colonoscopy: 11- Repeat 5 years  Bone Density:  Pap smear: Hysterectomy Ovaries removed per pt      Review of Systems  General: Not Present- Chills, Fever, Fatigue, Insomnia, Hot flashes/Night sweats, Weight gain  Skin: Not Present- Bruising, Change in Wart/Mole, Excessive Sweating, Itching, Nail Changes, New Lesions, Rash, Skin Color Changes and Ulcer.   HEENT: Not Present- Headache, Blurred Vision, Double Vision, Glaucoma, Visual Disturbances, Hearing Loss, Ringing in the Ears, Vertigo, Nose Bleed, Bleeding Gums, Hoarseness and Sore Throat. Neck: Not Present- Neck Pain and Neck Swelling. Respiratory: Not Present- Cough, Difficulty Breathing and Difficulty Breathing on Exertion. Breast: Not Present- Breast Mass, Breast Pain, Breast Swelling, Nipple Discharge, Nipple Pain, Recent Breast Size Changes and Skin Changes. Cardiovascular: Not Present- Abnormal Blood Pressure, Chest Pain, Edema, Fainting / Blacking Out, Palpitations, Shortness of Breath and Swelling of Extremities. Gastrointestinal: Not Present- Abdominal Pain, Abdominal Swelling, Bloating, Change in Bowel Habits, Constipation, Diarrhea, Difficulty Swallowing, Gets full quickly at meals, Nausea, Rectal Bleeding and Vomiting. Female Genitourinary: Not Present- Dysmenorrhea, Dyspareunia, Decreased libido, Excessive Menstrual Bleeding, Menstrual Irregularities, Pelvic Pain, Urinary Complaints, Vaginal Discharge, Vaginal itching/burning, Vaginal odor  Musculoskeletal: Not Present- Joint Pain and Muscle Pain. Neurological: Not Present- Dizziness, Fainting, Headaches and Seizures. Psychiatric: Not Present- Anxiety, Depression, Mood changes and Panic Attacks. Endocrine: Not Present- Appetite Changes, Cold Intolerance, Excessive Thirst, Excessive Urination and Heat Intolerance. Hematology: Not Present- Abnormal Bleeding, Easy Bruising and Enlarged Lymph Nodes. PHYSICAL EXAM:     /68   Ht 5' 5\" (1.651 m)   Wt 184 lb (83.5 kg)   BMI 30.62 kg/m²     Physical Exam   General   Mental Status - Alert. General Appearance - Cooperative. Integumentary   General Characteristics: Overall examination of the patient's skin reveals - no rashes and no suspicious lesions. Head and Neck  Head - normocephalic, atraumatic with no lesions or palpable masses.    Neck Note: Normal   Thyroid   Gland Characteristics - normal size and consistency and no palpable nodules. Chest and Lung Exam   Chest and lung exam reveals - on auscultation, normal breath sounds, no adventitious sounds and normal vocal resonance. Breast   Breast - Left - Normal. Right - Normal.     Cardiovascular   Cardiovascular examination reveals - normal heart sounds, regular rate and rhythm with no murmurs. Abdomen   Inspection: - Inspection Normal.   Palpation/Percussion: Palpation and Percussion of the abdomen reveal - Non Tender, No Rebound tenderness, No Rigidity (guarding), No hepatosplenomegaly, No Palpable abdominal masses and Soft. Auscultation: Auscultation of the abdomen reveals - Bowel sounds normal.     Female Genitourinary     External Genitalia   Vulva: - Normal. Perineum - Normal. Bartholin's Gland - Bilateral - Normal. Clitoris - Normal.   Introitus: Characteristics - Normal.   Urethra: Characteristics - Normal.     Speculum & Bimanual   Vagina: Vaginal Mucosa - Normal.   Vaginal Wall: - Normal.   Vaginal Lesions - None. Cervix: Characteristics - Normal.   Uterus: Characteristics - Normal.   Adnexa: - Normal.   Bladder - Normal.     Peripheral Vascular   Normal    Neuropsychiatric   Examination of related systems reveals - The patient is well-nourished and well-groomed. Mental status exam performed with findings of - Oriented X3 with appropriate mood and affect. Musculoskeletal  Normal      General Lymphatics  Normal           Medical problems and test results were reviewed with the patient today. ASSESSMENT and PLAN    1. Well woman exam with routine gynecological exam  2. Menopause  -     estradiol (ESTRACE) 2 MG tablet; Take 1 tablet by mouth daily TAKE 1 TABLET BY MOUTH EVERY DAY, Disp-90 tablet, R-4Normal  3. Visit for screening mammogram  -     [facility] EJ DIGITAL SCREEN BILATERAL; Future       Return in about 1 year (around 7/6/2023).        Mariia Stack MD  7/6/2022

## 2022-09-26 NOTE — PROGRESS NOTES
[address] [place] [address]  Phone [phone]  Fax [phone]  Zain BALTAZAR    Patient: Soheila Moeller  YOB: 1970  Patient Age 46 y.o. Patient sex: female  Medical Record:  [id number]  Visit Date:9/27/2022   Author:  Fredrick London. Eve Ruiz    [facility] Clinic Note     Chief complaint  Soheila Moeller  is a 46 y.o. female who was seen on 09/27/22  9:55 AM  for the following reasons:    Chief Complaint   Patient presents with    Other     Be Well       Current Medical problems addressed    Kevin Butler s a 49-year-old female with a history of hypertension COPD allergies,, Thyroid nodule, cervical radiculopathy, vitamin D deficiency, menopause, major depression, insomnia, and female. She previously saw Stephanie CARRANZA. She presents for repeat well visit. Her last labs were done 1/18/2020 at that time LDL was mildly elevated at 119 triglycerides mildly elevated at 171 CMP at that time noted glucose was normal at 96 kidney functions are normal and liver functions are normal.    She had full hysterectomy for endometriosis and had cervix removed so not required to do paps. She has her mammo scheduled for oct 28. She is getting flu shot when available. Discussed recommendations for covid and shingles vaccines  ASSESSMENT AND PLtAN    ICD-10-CM    1. Well adult exam  Z00.00       2. Menopause  Z78.0 estradiol (ESTRACE) 2 MG tablet      3. Other fatigue  R53.83 CBC with Auto Differential     TSH with Reflex      4. Need for hepatitis C screening test  Z11.59 Hepatitis C Antibody      5. Screening for heart disease  Z13.6 Comprehensive Metabolic Panel     Lipid Panel      6. Vitamin D deficiency, unspecified  E55.9 vitamin D (CHOLECALCIFEROL) 09716 UNIT CAPS     Vitamin D 25 Hydroxy      7. Encounter for screening for HIV  Z11.4 HIV 1/2 Ag/Ab, 4TH Generation,W Rflx Confirm      8.  Primary hypertension  I10 hydroCHLOROthiazide (HYDRODIURIL) 25 MG tablet losartan (COZAAR) 100 MG tablet     spironolactone (ALDACTONE) 50 MG tablet      9. Gastroesophageal reflux disease without esophagitis  K21.9 omeprazole (PRILOSEC) 40 MG delayed release capsule      10. Non-seasonal allergic rhinitis due to other allergic trigger  J30.89 fexofenadine (ALLEGRA) 180 MG tablet     azelastine HCl 0.15 % SOLN     fluticasone (FLONASE) 50 MCG/ACT nasal spray         Joaquina Rodriguez was seen today for other. Diagnoses and all orders for this visit:    Well adult exam    Menopause  -     estradiol (ESTRACE) 2 MG tablet; Take 1 tablet by mouth daily TAKE 1 TABLET BY MOUTH EVERY DAY    Other fatigue  -     CBC with Auto Differential  -     TSH with Reflex    Need for hepatitis C screening test  -     Hepatitis C Antibody    Screening for heart disease  -     Comprehensive Metabolic Panel  -     Lipid Panel    Vitamin D deficiency, unspecified  -     vitamin D (CHOLECALCIFEROL) 90548 UNIT CAPS; Take 1 capsule by mouth every 7 days  -     Vitamin D 25 Hydroxy    Encounter for screening for HIV  -     HIV 1/2 Ag/Ab, 4TH Generation,W Rflx Confirm    Primary hypertension  -     hydroCHLOROthiazide (HYDRODIURIL) 25 MG tablet; Take 1 tablet by mouth daily TAKE 1 TABLET BY MOUTH EVERY DAY  -     losartan (COZAAR) 100 MG tablet; Take 1 tablet by mouth daily  -     spironolactone (ALDACTONE) 50 MG tablet; Take 1 tablet by mouth daily    Gastroesophageal reflux disease without esophagitis  -     omeprazole (PRILOSEC) 40 MG delayed release capsule; Take 1 capsule by mouth daily TAKE 1 CAPSULE DAILY 30 min prior to breakfast    Non-seasonal allergic rhinitis due to other allergic trigger  -     fexofenadine (ALLEGRA) 180 MG tablet; Take 1 tablet by mouth daily  -     azelastine HCl 0.15 % SOLN; 2 sprays by Nasal route 2 times daily  -     fluticasone (FLONASE) 50 MCG/ACT nasal spray; 2 sprays by Nasal route daily    Other orders  -     traZODone (DESYREL) 100 MG tablet;  Take 1 tablet by mouth nightly 1 tablet qhs.    Plan includes the following:  Counseling   Reviewed recommendations for wellness for patients age including the following:  yearly vision checks;  2 x a year dental screens;  updating vaccinations that are indicated specifically for them; and at age 39 the recommendations for colonoscopy. We also reviewed their bmi and recommend weight management for goal of BMI between 18-25 with aerobic exercise for 40 min 5 x a week and low fat, lean protein diet. For women we also recommend mammograms and pap smears be updated as indicated. Follow up in 1 year for wellness exam   No follow-up provider specified. Orders Placed This Encounter   Procedures    CBC with Auto Differential    TSH with Reflex    Comprehensive Metabolic Panel    Lipid Panel    Vitamin D 25 Hydroxy    Hepatitis C Antibody    HIV 1/2 Ag/Ab, 4TH Generation,W Rflx Confirm       Past Medical History: Allergies:   Allergies   Allergen Reactions    Metronidazole Rash       Current Medications:   Medications marked \"taking\" at this time:  Current Outpatient Medications   Medication Sig Dispense Refill    estradiol (ESTRACE) 2 MG tablet Take 1 tablet by mouth daily TAKE 1 TABLET BY MOUTH EVERY DAY 90 tablet 3    hydroCHLOROthiazide (HYDRODIURIL) 25 MG tablet Take 1 tablet by mouth daily TAKE 1 TABLET BY MOUTH EVERY DAY 90 tablet 3    omeprazole (PRILOSEC) 40 MG delayed release capsule Take 1 capsule by mouth daily TAKE 1 CAPSULE DAILY 30 min prior to breakfast 90 capsule 3    traZODone (DESYREL) 100 MG tablet Take 1 tablet by mouth nightly 1 tablet qhs. 90 tablet 3    losartan (COZAAR) 100 MG tablet Take 1 tablet by mouth daily 90 tablet 3    fexofenadine (ALLEGRA) 180 MG tablet Take 1 tablet by mouth daily 90 tablet 3    azelastine HCl 0.15 % SOLN 2 sprays by Nasal route 2 times daily 1 mL 5    fluticasone (FLONASE) 50 MCG/ACT nasal spray 2 sprays by Nasal route daily 16 g 5    spironolactone (ALDACTONE) 50 MG tablet Take 1 tablet by mouth daily 90 tablet 3    vitamin D (CHOLECALCIFEROL) 65894 UNIT CAPS Take 1 capsule by mouth every 7 days 12 capsule 2    ibuprofen (ADVIL;MOTRIN) 200 MG tablet Take 200 mg by mouth as needed       No current facility-administered medications for this visit. Current Problem List:   Patient Active Problem List   Diagnosis    Breast mass seen on mammogram    Non-seasonal allergic rhinitis    Primary insomnia    HTN (hypertension)    Vitamin D deficiency    Cervical radiculopathy    Edema    Thyroid nodule    Allergic rhinitis due to pollen    Gastroesophageal reflux disease without esophagitis    Palpitations       Social History:   Social History     Tobacco Use    Smoking status: Never    Smokeless tobacco: Never   Substance Use Topics    Alcohol use:  Yes     Alcohol/week: 1.0 standard drink     Comment: occ       Family History:   Family History   Problem Relation Age of Onset    Hypertension Paternal Grandfather     Stroke Paternal Grandfather     Breast Cancer Paternal Aunt     Thyroid Cancer Neg Hx     Hypertension Mother     Lung Cancer Father     No Known Problems Brother     Heart Disease Paternal Grandmother     Hypertension Paternal Grandmother     Stroke Paternal Grandmother     Breast Cancer Paternal Grandmother     Bilateral breast cancer Paternal Grandmother     Hypertension Maternal Grandmother     Heart Disease Maternal Grandmother     Diabetes Maternal Grandmother     Osteoarthritis Maternal Grandmother     Hypertension Maternal Grandfather     Heart Disease Maternal Grandfather     Diabetes Maternal Grandfather     Osteoarthritis Maternal Grandfather     Heart Disease Paternal Grandfather        Surgical History:  Past Surgical History:   Procedure Laterality Date    BREAST BIOPSY Bilateral     BREAST LUMPECTOMY Left     benign    CARDIAC CATHETERIZATION      Benign per pt    COLONOSCOPY      11/19    HEENT      sinus surgery x 3    HYSTERECTOMY, TOTAL ABDOMINAL (CERVIX REMOVED)  1998    LYMPHADENECTOMY left axilla lymph nodes    SALPINGO-OOPHORECTOMY  1990s    SALPINGO-OOPHORECTOMY  1998       ROS  Review of Systems   Constitutional:  Negative for fever. Eyes:  Negative for visual disturbance. Respiratory:  Negative for cough and shortness of breath. Cardiovascular:  Negative for chest pain. Endocrine:        Hair loss chronically   Neurological:  Negative for syncope. /64   Pulse 85   Temp 98.1 °F (36.7 °C)   Ht 5' 5\" (1.651 m)   Wt 187 lb 9.6 oz (85.1 kg)   SpO2 97%   BMI 31.22 kg/m²   Body mass index is 31.22 kg/m². Physical Exam    Physical Exam  Vitals and nursing note reviewed. Constitutional:       Appearance: Normal appearance. Eyes:      Conjunctiva/sclera: Conjunctivae normal.   Cardiovascular:      Rate and Rhythm: Normal rate and regular rhythm. Pulmonary:      Effort: Pulmonary effort is normal.      Breath sounds: Normal breath sounds. Musculoskeletal:      Right lower leg: No edema. Left lower leg: No edema. Skin:     General: Skin is warm. Neurological:      Mental Status: She is alert and oriented to person, place, and time. Psychiatric:         Mood and Affect: Mood normal.        I have reviewed the patient's past medical history, social history and family history and vitals. We have discussed treatment plan and follow up and given patient instructions. Patient's questions are answered and we will follow up as indicated. Return in about 6 months (around 3/27/2023). Paige García PA-C  9:55 AM  9/27/2022

## 2022-09-27 ENCOUNTER — OFFICE VISIT (OUTPATIENT)
Dept: FAMILY MEDICINE CLINIC | Facility: CLINIC | Age: 52
End: 2022-09-27
Payer: COMMERCIAL

## 2022-09-27 VITALS
TEMPERATURE: 98.1 F | WEIGHT: 187.6 LBS | BODY MASS INDEX: 31.25 KG/M2 | HEART RATE: 85 BPM | HEIGHT: 65 IN | SYSTOLIC BLOOD PRESSURE: 106 MMHG | OXYGEN SATURATION: 97 % | DIASTOLIC BLOOD PRESSURE: 64 MMHG

## 2022-09-27 DIAGNOSIS — Z11.4 ENCOUNTER FOR SCREENING FOR HIV: ICD-10-CM

## 2022-09-27 DIAGNOSIS — R53.83 OTHER FATIGUE: ICD-10-CM

## 2022-09-27 DIAGNOSIS — K21.9 GASTROESOPHAGEAL REFLUX DISEASE WITHOUT ESOPHAGITIS: ICD-10-CM

## 2022-09-27 DIAGNOSIS — Z78.0 MENOPAUSE: ICD-10-CM

## 2022-09-27 DIAGNOSIS — E55.9 VITAMIN D DEFICIENCY, UNSPECIFIED: ICD-10-CM

## 2022-09-27 DIAGNOSIS — Z13.6 SCREENING FOR HEART DISEASE: ICD-10-CM

## 2022-09-27 DIAGNOSIS — Z11.59 NEED FOR HEPATITIS C SCREENING TEST: ICD-10-CM

## 2022-09-27 DIAGNOSIS — Z00.00 WELL ADULT EXAM: Primary | ICD-10-CM

## 2022-09-27 DIAGNOSIS — J30.89 NON-SEASONAL ALLERGIC RHINITIS DUE TO OTHER ALLERGIC TRIGGER: ICD-10-CM

## 2022-09-27 DIAGNOSIS — I10 PRIMARY HYPERTENSION: ICD-10-CM

## 2022-09-27 LAB
25(OH)D3 SERPL-MCNC: 35.1 NG/ML (ref 30–100)
ALBUMIN SERPL-MCNC: 3.3 G/DL (ref 3.5–5)
ALBUMIN/GLOB SERPL: 0.9 {RATIO} (ref 1.2–3.5)
ALP SERPL-CCNC: 87 U/L (ref 50–136)
ALT SERPL-CCNC: 19 U/L (ref 12–65)
ANION GAP SERPL CALC-SCNC: 4 MMOL/L (ref 4–13)
AST SERPL-CCNC: 12 U/L (ref 15–37)
BASOPHILS # BLD: 0.1 K/UL (ref 0–0.2)
BASOPHILS NFR BLD: 1 % (ref 0–2)
BILIRUB SERPL-MCNC: 0.4 MG/DL (ref 0.2–1.1)
BUN SERPL-MCNC: 12 MG/DL (ref 6–23)
CALCIUM SERPL-MCNC: 9.3 MG/DL (ref 8.3–10.4)
CHLORIDE SERPL-SCNC: 104 MMOL/L (ref 101–110)
CHOLEST SERPL-MCNC: 209 MG/DL
CO2 SERPL-SCNC: 29 MMOL/L (ref 21–32)
CREAT SERPL-MCNC: 0.9 MG/DL (ref 0.6–1)
DIFFERENTIAL METHOD BLD: NORMAL
EOSINOPHIL # BLD: 0.2 K/UL (ref 0–0.8)
EOSINOPHIL NFR BLD: 4 % (ref 0.5–7.8)
ERYTHROCYTE [DISTWIDTH] IN BLOOD BY AUTOMATED COUNT: 12.5 % (ref 11.9–14.6)
GLOBULIN SER CALC-MCNC: 3.8 G/DL (ref 2.3–3.5)
GLUCOSE SERPL-MCNC: 91 MG/DL (ref 65–100)
HCT VFR BLD AUTO: 41.3 % (ref 35.8–46.3)
HCV AB SER QL: NONREACTIVE
HDLC SERPL-MCNC: 59 MG/DL (ref 40–60)
HDLC SERPL: 3.5 {RATIO}
HGB BLD-MCNC: 13.6 G/DL (ref 11.7–15.4)
HIV 1+2 AB+HIV1 P24 AG SERPL QL IA: NONREACTIVE
HIV 1/2 RESULT COMMENT: NORMAL
IMM GRANULOCYTES # BLD AUTO: 0 K/UL (ref 0–0.5)
IMM GRANULOCYTES NFR BLD AUTO: 0 % (ref 0–5)
LDLC SERPL CALC-MCNC: 104.8 MG/DL
LYMPHOCYTES # BLD: 2.2 K/UL (ref 0.5–4.6)
LYMPHOCYTES NFR BLD: 33 % (ref 13–44)
MCH RBC QN AUTO: 29.6 PG (ref 26.1–32.9)
MCHC RBC AUTO-ENTMCNC: 32.9 G/DL (ref 31.4–35)
MCV RBC AUTO: 89.8 FL (ref 79.6–97.8)
MONOCYTES # BLD: 0.4 K/UL (ref 0.1–1.3)
MONOCYTES NFR BLD: 6 % (ref 4–12)
NEUTS SEG # BLD: 3.7 K/UL (ref 1.7–8.2)
NEUTS SEG NFR BLD: 56 % (ref 43–78)
NRBC # BLD: 0 K/UL (ref 0–0.2)
PLATELET # BLD AUTO: 368 K/UL (ref 150–450)
PMV BLD AUTO: 9.8 FL (ref 9.4–12.3)
POTASSIUM SERPL-SCNC: 4.1 MMOL/L (ref 3.5–5.1)
PROT SERPL-MCNC: 7.1 G/DL (ref 6.3–8.2)
RBC # BLD AUTO: 4.6 M/UL (ref 4.05–5.2)
SODIUM SERPL-SCNC: 137 MMOL/L (ref 136–145)
TRIGL SERPL-MCNC: 226 MG/DL (ref 35–150)
TSH W FREE THYROID IF ABNORMAL: 1.05 UIU/ML (ref 0.36–3.74)
VLDLC SERPL CALC-MCNC: 45.2 MG/DL (ref 6–23)
WBC # BLD AUTO: 6.6 K/UL (ref 4.3–11.1)

## 2022-09-27 PROCEDURE — 99396 PREV VISIT EST AGE 40-64: CPT | Performed by: PHYSICIAN ASSISTANT

## 2022-09-27 RX ORDER — FLUTICASONE PROPIONATE 50 MCG
2 SPRAY, SUSPENSION (ML) NASAL DAILY
Qty: 16 G | Refills: 5 | Status: SHIPPED | OUTPATIENT
Start: 2022-09-27

## 2022-09-27 RX ORDER — TRAZODONE HYDROCHLORIDE 100 MG/1
100 TABLET ORAL NIGHTLY
Qty: 90 TABLET | Refills: 3 | Status: SHIPPED | OUTPATIENT
Start: 2022-09-27

## 2022-09-27 RX ORDER — FEXOFENADINE HCL 180 MG/1
180 TABLET ORAL DAILY
Qty: 90 TABLET | Refills: 3 | Status: SHIPPED | OUTPATIENT
Start: 2022-09-27

## 2022-09-27 RX ORDER — HYDROCHLOROTHIAZIDE 25 MG/1
25 TABLET ORAL DAILY
Qty: 90 TABLET | Refills: 3 | Status: SHIPPED | OUTPATIENT
Start: 2022-09-27

## 2022-09-27 RX ORDER — OMEPRAZOLE 40 MG/1
40 CAPSULE, DELAYED RELEASE ORAL DAILY
Qty: 90 CAPSULE | Refills: 3 | Status: SHIPPED | OUTPATIENT
Start: 2022-09-27

## 2022-09-27 RX ORDER — ESTRADIOL 2 MG/1
2 TABLET ORAL DAILY
Qty: 90 TABLET | Refills: 3 | Status: SHIPPED | OUTPATIENT
Start: 2022-09-27

## 2022-09-27 RX ORDER — AZELASTINE HCL 205.5 UG/1
2 SPRAY NASAL 2 TIMES DAILY
Qty: 1 ML | Refills: 5 | Status: SHIPPED | OUTPATIENT
Start: 2022-09-27

## 2022-09-27 RX ORDER — SPIRONOLACTONE 50 MG/1
50 TABLET, FILM COATED ORAL DAILY
Qty: 90 TABLET | Refills: 3 | Status: SHIPPED | OUTPATIENT
Start: 2022-09-27

## 2022-09-27 RX ORDER — LOSARTAN POTASSIUM 100 MG/1
100 TABLET ORAL DAILY
Qty: 90 TABLET | Refills: 3 | Status: SHIPPED | OUTPATIENT
Start: 2022-09-27

## 2022-09-27 ASSESSMENT — PATIENT HEALTH QUESTIONNAIRE - PHQ9
SUM OF ALL RESPONSES TO PHQ9 QUESTIONS 1 & 2: 0
SUM OF ALL RESPONSES TO PHQ QUESTIONS 1-9: 0
2. FEELING DOWN, DEPRESSED OR HOPELESS: 0
SUM OF ALL RESPONSES TO PHQ QUESTIONS 1-9: 0
1. LITTLE INTEREST OR PLEASURE IN DOING THINGS: 0
SUM OF ALL RESPONSES TO PHQ QUESTIONS 1-9: 0
SUM OF ALL RESPONSES TO PHQ QUESTIONS 1-9: 0

## 2022-09-27 ASSESSMENT — ENCOUNTER SYMPTOMS
COUGH: 0
SHORTNESS OF BREATH: 0

## 2022-09-27 NOTE — PATIENT INSTRUCTIONS
Patient Education        Well Visit, Women 48 to 72: Care Instructions  Overview     Well visits can help you stay healthy. Your doctor has checked your overall health and may have suggested ways to take good care of yourself. Your doctor also may have recommended tests. At home, you can help prevent illness with healthy eating, regular exercise, and other steps. Follow-up care is a key part of your treatment and safety. Be sure to make and go to all appointments, and call your doctor if you are having problems. It's also a good idea to know your test results and keep a list of the medicines you take. How can you care for yourself at home? Get screening tests that you and your doctor decide on. Screening helps find diseases before any symptoms appear. Eat healthy foods. Choose fruits, vegetables, whole grains, protein, and low-fat dairy foods. Limit fat, especially saturated fat. Reduce salt in your diet. Limit alcohol. Have no more than 1 drink a day or 7 drinks a week. Get at least 30 minutes of exercise on most days of the week. Walking is a good choice. You also may want to do other activities, such as running, swimming, cycling, or playing tennis or team sports. Reach and stay at a healthy weight. This will lower your risk for many problems, such as obesity, diabetes, heart disease, and high blood pressure. Do not smoke. Smoking can make health problems worse. If you need help quitting, talk to your doctor about stop-smoking programs and medicines. These can increase your chances of quitting for good. Care for your mental health. It is easy to get weighed down by worry and stress. Learn strategies to manage stress, like deep breathing and mindfulness, and stay connected with your family and community. If you find you often feel sad or hopeless, talk with your doctor. Treatment can help. Talk to your doctor about whether you have any risk factors for sexually transmitted infections (STIs).  You can help

## 2022-10-28 ENCOUNTER — HOSPITAL ENCOUNTER (OUTPATIENT)
Dept: MAMMOGRAPHY | Age: 52
Discharge: HOME | End: 2022-10-31
Payer: COMMERCIAL

## 2022-10-28 PROCEDURE — 77067 SCR MAMMO BI INCL CAD: CPT

## 2022-11-10 ENCOUNTER — APPOINTMENT (OUTPATIENT)
Dept: MAMMOGRAPHY | Age: 52
End: 2022-11-10
Payer: COMMERCIAL

## 2022-11-10 ENCOUNTER — HOSPITAL ENCOUNTER (OUTPATIENT)
Dept: MAMMOGRAPHY | Age: 52
Discharge: HOME | End: 2022-11-13
Payer: COMMERCIAL

## 2022-11-10 DIAGNOSIS — R92.8 ABNORMAL SCREENING MAMMOGRAM: ICD-10-CM

## 2022-11-10 PROCEDURE — 77065 DX MAMMO INCL CAD UNI: CPT

## 2023-02-15 DIAGNOSIS — R06.02 SOB (SHORTNESS OF BREATH): Primary | ICD-10-CM

## 2023-02-15 DIAGNOSIS — R06.02 SOB (SHORTNESS OF BREATH): ICD-10-CM

## 2023-02-15 LAB — D DIMER PPP FEU-MCNC: <0.27 UG/ML(FEU)

## 2023-03-14 ENCOUNTER — PATIENT MESSAGE (OUTPATIENT)
Dept: FAMILY MEDICINE CLINIC | Facility: CLINIC | Age: 53
End: 2023-03-14

## 2023-03-14 DIAGNOSIS — R06.02 SHORTNESS OF BREATH: Primary | ICD-10-CM

## 2023-03-14 NOTE — TELEPHONE ENCOUNTER
From: Maurice Renteria  To: Belkisninfalebron Armando  Sent: 3/14/2023 12:42 PM EDT  Subject: Chest Xray    Hi. Im am having some trouble taking in a deep breath. I had a d-dimer drawn which was negative. I am wondering if I can get a chest X-ray ordered?  Thanks

## 2023-03-15 ENCOUNTER — HOSPITAL ENCOUNTER (OUTPATIENT)
Dept: GENERAL RADIOLOGY | Age: 53
Discharge: HOME | End: 2023-03-18

## 2023-03-15 DIAGNOSIS — R06.02 SHORTNESS OF BREATH: ICD-10-CM

## 2023-03-29 ENCOUNTER — OFFICE VISIT (OUTPATIENT)
Dept: FAMILY MEDICINE CLINIC | Facility: CLINIC | Age: 53
End: 2023-03-29
Payer: COMMERCIAL

## 2023-03-29 VITALS
BODY MASS INDEX: 30.25 KG/M2 | HEART RATE: 98 BPM | DIASTOLIC BLOOD PRESSURE: 80 MMHG | HEIGHT: 66 IN | TEMPERATURE: 98 F | WEIGHT: 188.2 LBS | SYSTOLIC BLOOD PRESSURE: 122 MMHG | OXYGEN SATURATION: 98 %

## 2023-03-29 DIAGNOSIS — E55.9 VITAMIN D DEFICIENCY, UNSPECIFIED: ICD-10-CM

## 2023-03-29 DIAGNOSIS — I10 PRIMARY HYPERTENSION: ICD-10-CM

## 2023-03-29 DIAGNOSIS — R00.2 PALPITATIONS: ICD-10-CM

## 2023-03-29 DIAGNOSIS — R06.02 SHORTNESS OF BREATH: Primary | ICD-10-CM

## 2023-03-29 LAB
BASOPHILS # BLD: 0.1 K/UL (ref 0–0.2)
BASOPHILS NFR BLD: 1 % (ref 0–2)
DIFFERENTIAL METHOD BLD: NORMAL
EOSINOPHIL # BLD: 0.2 K/UL (ref 0–0.8)
EOSINOPHIL NFR BLD: 4 % (ref 0.5–7.8)
ERYTHROCYTE [DISTWIDTH] IN BLOOD BY AUTOMATED COUNT: 12.4 % (ref 11.9–14.6)
HCT VFR BLD AUTO: 39.2 % (ref 35.8–46.3)
HGB BLD-MCNC: 13.3 G/DL (ref 11.7–15.4)
IMM GRANULOCYTES # BLD AUTO: 0 K/UL (ref 0–0.5)
IMM GRANULOCYTES NFR BLD AUTO: 0 % (ref 0–5)
LYMPHOCYTES # BLD: 2.5 K/UL (ref 0.5–4.6)
LYMPHOCYTES NFR BLD: 40 % (ref 13–44)
MCH RBC QN AUTO: 29.7 PG (ref 26.1–32.9)
MCHC RBC AUTO-ENTMCNC: 33.9 G/DL (ref 31.4–35)
MCV RBC AUTO: 87.5 FL (ref 82–102)
MONOCYTES # BLD: 0.4 K/UL (ref 0.1–1.3)
MONOCYTES NFR BLD: 6 % (ref 4–12)
NEUTS SEG # BLD: 3.1 K/UL (ref 1.7–8.2)
NEUTS SEG NFR BLD: 49 % (ref 43–78)
NRBC # BLD: 0 K/UL (ref 0–0.2)
PLATELET # BLD AUTO: 338 K/UL (ref 150–450)
PMV BLD AUTO: 9.8 FL (ref 9.4–12.3)
RBC # BLD AUTO: 4.48 M/UL (ref 4.05–5.2)
WBC # BLD AUTO: 6.3 K/UL (ref 4.3–11.1)

## 2023-03-29 PROCEDURE — 99214 OFFICE O/P EST MOD 30 MIN: CPT | Performed by: PHYSICIAN ASSISTANT

## 2023-03-29 PROCEDURE — 3074F SYST BP LT 130 MM HG: CPT | Performed by: PHYSICIAN ASSISTANT

## 2023-03-29 PROCEDURE — 3079F DIAST BP 80-89 MM HG: CPT | Performed by: PHYSICIAN ASSISTANT

## 2023-03-29 SDOH — ECONOMIC STABILITY: FOOD INSECURITY: WITHIN THE PAST 12 MONTHS, THE FOOD YOU BOUGHT JUST DIDN'T LAST AND YOU DIDN'T HAVE MONEY TO GET MORE.: NEVER TRUE

## 2023-03-29 SDOH — ECONOMIC STABILITY: HOUSING INSECURITY
IN THE LAST 12 MONTHS, WAS THERE A TIME WHEN YOU DID NOT HAVE A STEADY PLACE TO SLEEP OR SLEPT IN A SHELTER (INCLUDING NOW)?: NO

## 2023-03-29 SDOH — ECONOMIC STABILITY: FOOD INSECURITY: WITHIN THE PAST 12 MONTHS, YOU WORRIED THAT YOUR FOOD WOULD RUN OUT BEFORE YOU GOT MONEY TO BUY MORE.: NEVER TRUE

## 2023-03-29 SDOH — ECONOMIC STABILITY: INCOME INSECURITY: HOW HARD IS IT FOR YOU TO PAY FOR THE VERY BASICS LIKE FOOD, HOUSING, MEDICAL CARE, AND HEATING?: NOT HARD AT ALL

## 2023-03-29 ASSESSMENT — ENCOUNTER SYMPTOMS
SHORTNESS OF BREATH: 0
COUGH: 0

## 2023-03-29 ASSESSMENT — PATIENT HEALTH QUESTIONNAIRE - PHQ9
2. FEELING DOWN, DEPRESSED OR HOPELESS: 0
SUM OF ALL RESPONSES TO PHQ QUESTIONS 1-9: 0
SUM OF ALL RESPONSES TO PHQ9 QUESTIONS 1 & 2: 0
SUM OF ALL RESPONSES TO PHQ QUESTIONS 1-9: 0
1. LITTLE INTEREST OR PLEASURE IN DOING THINGS: 0
SUM OF ALL RESPONSES TO PHQ QUESTIONS 1-9: 0
SUM OF ALL RESPONSES TO PHQ QUESTIONS 1-9: 0

## 2023-03-29 NOTE — PROGRESS NOTES
specified. Orders Placed This Encounter   Procedures    Vitamin D 25 Hydroxy     Standing Status:   Future     Number of Occurrences:   1     Standing Expiration Date:   3/29/2024    Comprehensive Metabolic Panel     Standing Status:   Future     Number of Occurrences:   1     Standing Expiration Date:   3/29/2024    CBC with Auto Differential     Standing Status:   Future     Number of Occurrences:   1     Standing Expiration Date:   3/29/2024    TSH     Standing Status:   Future     Number of Occurrences:   1     Standing Expiration Date:   3/29/2024    T4, Free     Standing Status:   Future     Number of Occurrences:   1     Standing Expiration Date:   3/29/2024    Lipid Panel     Standing Status:   Future     Number of Occurrences:   1     Standing Expiration Date:   3/29/2024    [facility] - Candy Bearden MD, Pulmonology, [place]     Referral Priority:   Routine     Referral Type:   Eval and Treat     Referral Reason:   Specialty Services Required     Referred to Provider:   Madeline Phalen, MD     Requested Specialty:   Pulmonary Disease     Number of Visits Requested:   1    Valerio Rodríguez MD, Cardiology, [place]     Referral Priority:   Routine     Referral Type:   Eval and Treat     Referral Reason:   Specialty Services Required     Referred to Provider:   Hayden Victor MD     Requested Specialty:   Cardiology     Number of Visits Requested:   1       Past Medical History: Allergies:   Allergies   Allergen Reactions    Metronidazole Rash       Current Medications:   Medications marked \"taking\" at this time:  Current Outpatient Medications   Medication Sig Dispense Refill    estradiol (ESTRACE) 2 MG tablet Take 1 tablet by mouth daily TAKE 1 TABLET BY MOUTH EVERY DAY 90 tablet 3    hydroCHLOROthiazide (HYDRODIURIL) 25 MG tablet Take 1 tablet by mouth daily TAKE 1 TABLET BY MOUTH EVERY DAY 90 tablet 3    omeprazole (PRILOSEC) 40 MG delayed release capsule Take 1 capsule by mouth daily TAKE 1

## 2023-03-30 LAB
25(OH)D3 SERPL-MCNC: 83.1 NG/ML (ref 30–100)
ALBUMIN SERPL-MCNC: 3.4 G/DL (ref 3.5–5)
ALBUMIN/GLOB SERPL: 1.1 (ref 0.4–1.6)
ALP SERPL-CCNC: 72 U/L (ref 50–136)
ALT SERPL-CCNC: 25 U/L (ref 12–65)
ANION GAP SERPL CALC-SCNC: 5 MMOL/L (ref 2–11)
AST SERPL-CCNC: 13 U/L (ref 15–37)
BILIRUB SERPL-MCNC: 0.2 MG/DL (ref 0.2–1.1)
BUN SERPL-MCNC: 14 MG/DL (ref 6–23)
CALCIUM SERPL-MCNC: 9.2 MG/DL (ref 8.3–10.4)
CHLORIDE SERPL-SCNC: 106 MMOL/L (ref 101–110)
CHOLEST SERPL-MCNC: 182 MG/DL
CO2 SERPL-SCNC: 27 MMOL/L (ref 21–32)
CREAT SERPL-MCNC: 0.8 MG/DL (ref 0.6–1)
GLOBULIN SER CALC-MCNC: 3.2 G/DL (ref 2.8–4.5)
GLUCOSE SERPL-MCNC: 96 MG/DL (ref 65–100)
HDLC SERPL-MCNC: 56 MG/DL (ref 40–60)
HDLC SERPL: 3.3
LDLC SERPL CALC-MCNC: 84.8 MG/DL
POTASSIUM SERPL-SCNC: 4.3 MMOL/L (ref 3.5–5.1)
PROT SERPL-MCNC: 6.6 G/DL (ref 6.3–8.2)
SODIUM SERPL-SCNC: 138 MMOL/L (ref 133–143)
T4 FREE SERPL-MCNC: 1.1 NG/DL (ref 0.78–1.46)
TRIGL SERPL-MCNC: 206 MG/DL (ref 35–150)
TSH, 3RD GENERATION: 1.17 UIU/ML (ref 0.36–3.74)
VLDLC SERPL CALC-MCNC: 41.2 MG/DL (ref 6–23)

## 2023-04-04 ENCOUNTER — OFFICE VISIT (OUTPATIENT)
Dept: PULMONOLOGY | Age: 53
End: 2023-04-04
Payer: COMMERCIAL

## 2023-04-04 VITALS
HEIGHT: 65 IN | BODY MASS INDEX: 31.16 KG/M2 | SYSTOLIC BLOOD PRESSURE: 124 MMHG | OXYGEN SATURATION: 99 % | DIASTOLIC BLOOD PRESSURE: 87 MMHG | WEIGHT: 187 LBS | TEMPERATURE: 97.2 F | HEART RATE: 100 BPM | RESPIRATION RATE: 14 BRPM

## 2023-04-04 DIAGNOSIS — G47.33 OSA (OBSTRUCTIVE SLEEP APNEA): ICD-10-CM

## 2023-04-04 DIAGNOSIS — R06.02 SOB (SHORTNESS OF BREATH): Primary | ICD-10-CM

## 2023-04-04 DIAGNOSIS — J30.9 ALLERGIC RHINITIS, UNSPECIFIED SEASONALITY, UNSPECIFIED TRIGGER: ICD-10-CM

## 2023-04-04 LAB
EXPIRATORY TIME: NORMAL
FEF 25-75% %PRED-PRE: NORMAL
FEF 25-75% PRED: NORMAL
FEF 25-75%-PRE: NORMAL
FEV1 %PRED-PRE: 103 %
FEV1 PRED: NORMAL
FEV1/FVC %PRED-PRE: NORMAL
FEV1/FVC PRED: NORMAL
FEV1/FVC: 80 %
FEV1: 2.88 L
FVC %PRED-PRE: 101 %
FVC PRED: NORMAL
FVC: 3.62 L
PEF %PRED-PRE: NORMAL
PEF PRED: NORMAL
PEF-PRE: NORMAL

## 2023-04-04 PROCEDURE — 3078F DIAST BP <80 MM HG: CPT | Performed by: INTERNAL MEDICINE

## 2023-04-04 PROCEDURE — 99204 OFFICE O/P NEW MOD 45 MIN: CPT | Performed by: INTERNAL MEDICINE

## 2023-04-04 PROCEDURE — 94010 BREATHING CAPACITY TEST: CPT | Performed by: INTERNAL MEDICINE

## 2023-04-04 PROCEDURE — 3074F SYST BP LT 130 MM HG: CPT | Performed by: INTERNAL MEDICINE

## 2023-04-04 RX ORDER — AZELASTINE HYDROCHLORIDE, FLUTICASONE PROPIONATE 137; 50 UG/1; UG/1
2 SPRAY, METERED NASAL 2 TIMES DAILY
Qty: 1 EACH | Refills: 11 | Status: SHIPPED | OUTPATIENT
Start: 2023-04-04 | End: 2023-04-07 | Stop reason: ALTCHOICE

## 2023-04-04 RX ORDER — IPRATROPIUM BROMIDE 21 UG/1
2 SPRAY, METERED NASAL EVERY 8 HOURS PRN
Qty: 1 EACH | Refills: 5 | Status: SHIPPED | OUTPATIENT
Start: 2023-04-04

## 2023-04-04 ASSESSMENT — PULMONARY FUNCTION TESTS
FEV1: 2.88
FEV1/FVC: 80
FVC: 3.62
FEV1_PERCENT_PREDICTED_PRE: 103
FVC_PERCENT_PREDICTED_PRE: 101

## 2023-04-04 NOTE — PROGRESS NOTES
visit.        Screening chest CT: No results found for this or any previous visit. CT of chest without contrast:   No results found for this or any previous visit. CT of chest with contrast:  No valid procedures specified. PET/CT: No valid procedures specified. Spirometry:  4/4/2023        Office Spirometry Results Latest Ref Rng & Units 4/4/2023   FVC L 3.62   FEV1 L 2.88   FEV1 %PRED-PRE % 103   FVC %PRED-PRE % 101   FEV1/FVC % 80         ASSESSMENT:  (Medical Decision Making)        Diagnosis Orders   1. SOB (shortness of breath)  Spirometry Without Bronchodilator    Spirometry Without Bronchodilator  -Patient has normal chest x-ray  Spirometry there is no evidence of any lung disease at this point. I will do further work-up including complete pulmonary function test and if those are abnormal she would need chest CT scan. However there is some suggestion that her shortness of breath could be related to deconditioning as she gained 25 pounds over the last couple years and also anxiety as the way she described her symptoms not a lot during the rest not with exertion which are very typical of anxiety related shortness of breath and also given her palpitations. However also cardiac condition needs to be ruled out and she is already scheduled to see her cardiologist this coming Friday. 2. EDITH (obstructive sleep apnea)  [facility] - Carla Martel MD, Sleep Medicine, Romelle Cam  -She agrees to go to see sleep doctors again. She says that she stopped using her CPAP because she was having itching and runny nose however it was more related that her postnasal drainage was not well controlled        3.  Allergic rhinitis, unspecified seasonality, unspecified trigger  Since her allergies are still not well controlled so I will change her to Dymista and add Atrovent nasal spray to her regimen             PLAN:  - referral to asleep  - start dymista   -add atrovent nasal spray  - needs complete

## 2023-04-05 ASSESSMENT — ENCOUNTER SYMPTOMS: SHORTNESS OF BREATH: 1

## 2023-04-07 ENCOUNTER — OFFICE VISIT (OUTPATIENT)
Dept: CARDIOLOGY CLINIC | Age: 53
End: 2023-04-07

## 2023-04-07 VITALS
BODY MASS INDEX: 31.32 KG/M2 | HEIGHT: 65 IN | DIASTOLIC BLOOD PRESSURE: 88 MMHG | SYSTOLIC BLOOD PRESSURE: 116 MMHG | WEIGHT: 188 LBS | HEART RATE: 90 BPM

## 2023-04-07 DIAGNOSIS — I10 PRIMARY HYPERTENSION: ICD-10-CM

## 2023-04-07 DIAGNOSIS — G47.33 OSA (OBSTRUCTIVE SLEEP APNEA): Chronic | ICD-10-CM

## 2023-04-07 DIAGNOSIS — R00.2 PALPITATIONS: ICD-10-CM

## 2023-04-07 DIAGNOSIS — R06.02 SHORTNESS OF BREATH: Primary | ICD-10-CM

## 2023-04-07 DIAGNOSIS — J30.89 NON-SEASONAL ALLERGIC RHINITIS, UNSPECIFIED TRIGGER: ICD-10-CM

## 2023-04-07 ASSESSMENT — ENCOUNTER SYMPTOMS
SHORTNESS OF BREATH: 1
ABDOMINAL PAIN: 0
BACK PAIN: 0
COUGH: 0

## 2023-04-07 NOTE — PROGRESS NOTES
[facility] CARDIOLOGY  [address]  [address]      23      NAME:  Lalo Jackson  : 1970  MRN: [id number]      SUBJECTIVE:   Lalo Jackson is a 45 y.o. female seen for a follow up visit regarding the following:     Chief Complaint   Patient presents with    Shortness of Breath    Hypertension       HPI:   45 y.o. female with long history of dyspnea. She was seen in the office in  through 2018. She had normal echocardiogram.  She underwent cardiac catheterization  with normal coronary arteries. She now describes new onset sensation that she cannot take a deep breath. These episodes are random and nonexertional.  She does have some palpitations and chest fullness during these episodes. She has had a chest x-ray which is normal.  Lab work which is normal.  She also had pulmonary PFTs demonstrating normal lung function. Past Medical History, Past Surgical History, Family history, Social History, and Medications were all reviewed with the patient today and updated as necessary.      Current Outpatient Medications   Medication Sig Dispense Refill    ipratropium (ATROVENT) 0.03 % nasal spray 2 sprays by Each Nostril route every 8 hours as needed for Rhinitis 1 each 5    estradiol (ESTRACE) 2 MG tablet Take 1 tablet by mouth daily TAKE 1 TABLET BY MOUTH EVERY DAY 90 tablet 3    hydroCHLOROthiazide (HYDRODIURIL) 25 MG tablet Take 1 tablet by mouth daily TAKE 1 TABLET BY MOUTH EVERY DAY 90 tablet 3    omeprazole (PRILOSEC) 40 MG delayed release capsule Take 1 capsule by mouth daily TAKE 1 CAPSULE DAILY 30 min prior to breakfast 90 capsule 3    traZODone (DESYREL) 100 MG tablet Take 1 tablet by mouth nightly 1 tablet qhs. 90 tablet 3    losartan (COZAAR) 100 MG tablet Take 1 tablet by mouth daily 90 tablet 3    fexofenadine (ALLEGRA) 180 MG tablet Take 1 tablet by mouth daily 90 tablet 3    azelastine HCl 0.15 % SOLN 2 sprays by Nasal route 2 times daily 1 mL

## 2023-04-28 ENCOUNTER — OFFICE VISIT (OUTPATIENT)
Dept: OCCUPATIONAL MEDICINE | Age: 53
End: 2023-04-28

## 2023-04-28 VITALS
TEMPERATURE: 98 F | OXYGEN SATURATION: 97 % | BODY MASS INDEX: 31.36 KG/M2 | DIASTOLIC BLOOD PRESSURE: 69 MMHG | HEIGHT: 65 IN | RESPIRATION RATE: 18 BRPM | SYSTOLIC BLOOD PRESSURE: 112 MMHG | HEART RATE: 88 BPM | WEIGHT: 188.2 LBS

## 2023-04-28 DIAGNOSIS — H92.02 LEFT EAR PAIN: ICD-10-CM

## 2023-04-28 DIAGNOSIS — M26.622 ARTHRALGIA OF LEFT TEMPOROMANDIBULAR JOINT: Primary | ICD-10-CM

## 2023-04-28 ASSESSMENT — ENCOUNTER SYMPTOMS
EYES NEGATIVE: 1
SORE THROAT: 0
SINUS PAIN: 0
FACIAL SWELLING: 0
RESPIRATORY NEGATIVE: 1

## 2023-04-28 NOTE — PROGRESS NOTES
Subjective:      Patient ID: [address] is a 45 y.o. female. Otalgia   This is a new problem. Episode onset: 5 days. There has been no fever. Pertinent negatives include no ear discharge, headaches or sore throat. Associated symptoms comments: History of allergies and takes allergy medications daily. She has tried NSAIDs (allergy medications) for the symptoms. The treatment provided mild relief. Chewing and opening and closing her mouth makes the pain worse. Patient reports she is eating softer foods and it helps with the discomfort    Review of Systems   Constitutional:  Negative for chills, fatigue and fever. HENT:  Positive for ear pain. Negative for drooling, ear discharge, facial swelling, sinus pain and sore throat. Left ear pain, left side jaw pain- opening and closing mouth makes discomfort worse. Patient has a history of seasonal allergies and takes Flonase nasal spray, Allegra, Atrovent nasal sprat, and azelastin nasal spray to control symptoms   Eyes: Negative. Respiratory: Negative. Cardiovascular: Negative. Musculoskeletal:  Negative for myalgias. Neurological:  Negative for dizziness and headaches.      Allergies   Allergen Reactions    Metronidazole Rash      Current Outpatient Medications on File Prior to Visit   Medication Sig Dispense Refill    ipratropium (ATROVENT) 0.03 % nasal spray 2 sprays by Each Nostril route every 8 hours as needed for Rhinitis 1 each 5    estradiol (ESTRACE) 2 MG tablet Take 1 tablet by mouth daily TAKE 1 TABLET BY MOUTH EVERY DAY 90 tablet 3    hydroCHLOROthiazide (HYDRODIURIL) 25 MG tablet Take 1 tablet by mouth daily TAKE 1 TABLET BY MOUTH EVERY DAY 90 tablet 3    omeprazole (PRILOSEC) 40 MG delayed release capsule Take 1 capsule by mouth daily TAKE 1 CAPSULE DAILY 30 min prior to breakfast 90 capsule 3    traZODone (DESYREL) 100 MG tablet Take 1 tablet by mouth nightly 1 tablet qhs. 90 tablet 3    losartan (COZAAR) 100 MG tablet Take 1

## 2023-05-02 ENCOUNTER — TELEPHONE (OUTPATIENT)
Dept: OCCUPATIONAL MEDICINE | Age: 53
End: 2023-05-02

## 2023-05-02 DIAGNOSIS — M26.622 ARTHRALGIA OF LEFT TEMPOROMANDIBULAR JOINT: Primary | ICD-10-CM

## 2023-05-02 RX ORDER — CYCLOBENZAPRINE HCL 5 MG
5 TABLET ORAL NIGHTLY PRN
Qty: 10 TABLET | Refills: 0 | Status: SHIPPED | OUTPATIENT
Start: 2023-05-02 | End: 2023-05-12

## 2023-05-02 NOTE — TELEPHONE ENCOUNTER
Patient sent a Jobr message stating: \"I am still experiencing some jaw pain. Would you be able to prescribe me a few muscle relaxers to see if that will help? \"     Called patient to follow up - patient states that she has used flexeril and Soma (Carisoprodol) muscle relaxers in the past. Patient thinks she's used a low dose before. Patient intends to use the muscle relaxer for night time only. Patient has not tried a moldable mouth guard at night before. Patient follows with her dentist every 6 months but doesn't have a visit scheduled particularly soon. Patient notes some increased stress lately as her daughter is expecting a baby any day now but has some complications with the pregnancy. Orders Placed This Encounter    cyclobenzaprine (FLEXERIL) 5 MG tablet     Sig: Take 1 tablet by mouth nightly as needed for Muscle spasms     Dispense:  10 tablet     Refill:  0     Sent in 10 days of nightly flexeril to the patient's requested pharmacy and advised the patient to get a moldable nightguard and mold it to the bottom teeth and use it at night to prevent teeth grinding. Advised patient to follow up with her dentist if she isn't seeing any further relief with these interventions. Reminded patient about the [employer] benefit offered through [facility], where employees (and their dependents) get free phone support, as well as referrals for in-person consultations with clinical, legal and financial professionals. To access these resources, call [phone], text FLORECITA to [phone], or visit [employer] (password: [email or web address]). Patient verbalized understanding and agreed with the above plan of care. Advised patient to follow up with the [facility] (employee wellness center) at [phone] if they need anything else.      FRANK Hercules - NP

## 2023-06-01 ENCOUNTER — TELEMEDICINE (OUTPATIENT)
Dept: FAMILY MEDICINE CLINIC | Facility: CLINIC | Age: 53
End: 2023-06-01
Payer: COMMERCIAL

## 2023-06-01 DIAGNOSIS — R68.84 JAW PAIN: Primary | ICD-10-CM

## 2023-06-01 PROCEDURE — 99213 OFFICE O/P EST LOW 20 MIN: CPT | Performed by: PHYSICIAN ASSISTANT

## 2023-06-01 RX ORDER — CYCLOBENZAPRINE HCL 5 MG
5 TABLET ORAL NIGHTLY PRN
Qty: 30 TABLET | Refills: 0 | Status: SHIPPED | OUTPATIENT
Start: 2023-06-01 | End: 2023-06-11

## 2023-06-01 RX ORDER — MELOXICAM 7.5 MG/1
7.5 TABLET ORAL DAILY
Qty: 30 TABLET | Refills: 3 | Status: SHIPPED | OUTPATIENT
Start: 2023-06-01

## 2023-06-01 SDOH — ECONOMIC STABILITY: FOOD INSECURITY: WITHIN THE PAST 12 MONTHS, THE FOOD YOU BOUGHT JUST DIDN'T LAST AND YOU DIDN'T HAVE MONEY TO GET MORE.: NEVER TRUE

## 2023-06-01 SDOH — ECONOMIC STABILITY: INCOME INSECURITY: HOW HARD IS IT FOR YOU TO PAY FOR THE VERY BASICS LIKE FOOD, HOUSING, MEDICAL CARE, AND HEATING?: NOT HARD AT ALL

## 2023-06-01 SDOH — ECONOMIC STABILITY: FOOD INSECURITY: WITHIN THE PAST 12 MONTHS, YOU WORRIED THAT YOUR FOOD WOULD RUN OUT BEFORE YOU GOT MONEY TO BUY MORE.: NEVER TRUE

## 2023-06-01 ASSESSMENT — PATIENT HEALTH QUESTIONNAIRE - PHQ9
SUM OF ALL RESPONSES TO PHQ QUESTIONS 1-9: 0
SUM OF ALL RESPONSES TO PHQ9 QUESTIONS 1 & 2: 0
SUM OF ALL RESPONSES TO PHQ QUESTIONS 1-9: 0
2. FEELING DOWN, DEPRESSED OR HOPELESS: 0
1. LITTLE INTEREST OR PLEASURE IN DOING THINGS: 0
SUM OF ALL RESPONSES TO PHQ QUESTIONS 1-9: 0
SUM OF ALL RESPONSES TO PHQ QUESTIONS 1-9: 0

## 2023-06-01 NOTE — PROGRESS NOTES
Patient: Rosie Ray  YOB: 1970  Patient Age 45 y.o. Patient sex: female  Medical Record:  [id number]  Visit Date: 6/1/2023  Author:  Autumn Gastelum. [facility] Virtual  Visit Note Video Conference Note  Location: To Patients electronic /phone device in their home  From Medical provider     Rosie Ray is a 45 y.o. y.o. female  being evaluated by a Virtual Visit (video visit) encounter to address concerns. Consent:  The patient and/or health care decision maker is aware that that they may receive a bill for this audio-video service, depending on his insurance coverage, and has provided verbal consent to proceed: Yes    Chief Complaint  Rosie Ray  is a 45 y.o. female who was seen by synchronous (real-time) audio-video technology on 06/04/23  6:03 PM  for the following reasons:    Chief Complaint   Patient presents with    Jaw Pain        Current medical issues addressed today:  She reports jaw pain for about 4 weeks. - hurts to eat and bite down. He notes left ear hurt as well. Saw employee health and ears looked fine and recommended seeing dentisty. She tried flexeril and it helped a little. The last few days had stabbing pain in left temple intermittent occurring about 5 times and hour and lasting a few seconds - not as frequent today. only on left templeand jaw is tender on the left side - sometimes feels like its shifted and hard to align the teeth. ASSESSMENT & PLAN    ICD-10-CM    1. Jaw pain  R68.84            Diagnoses and all orders for this visit:  Jaw pain  Other orders  -     meloxicam (MOBIC) 7.5 MG tablet; Take 1 tablet by mouth daily  -     cyclobenzaprine (FLEXERIL) 5 MG tablet; Take 1 tablet by mouth nightly as needed for Muscle spasms     Lj Tejeda was seen today for jaw pain. Diagnoses and all orders for this visit:    Jaw pain    Other orders  -     meloxicam (MOBIC) 7.5 MG tablet;  Take 1 tablet by mouth daily  -     cyclobenzaprine (FLEXERIL) 5 MG

## 2023-07-12 ENCOUNTER — OFFICE VISIT (OUTPATIENT)
Age: 53
End: 2023-07-12
Payer: COMMERCIAL

## 2023-07-12 VITALS
HEIGHT: 65 IN | WEIGHT: 188 LBS | HEART RATE: 90 BPM | DIASTOLIC BLOOD PRESSURE: 60 MMHG | SYSTOLIC BLOOD PRESSURE: 118 MMHG | BODY MASS INDEX: 31.32 KG/M2

## 2023-07-12 DIAGNOSIS — J30.1 NON-SEASONAL ALLERGIC RHINITIS DUE TO POLLEN: ICD-10-CM

## 2023-07-12 DIAGNOSIS — R06.02 SHORTNESS OF BREATH: Primary | Chronic | ICD-10-CM

## 2023-07-12 DIAGNOSIS — R00.2 PALPITATIONS: ICD-10-CM

## 2023-07-12 DIAGNOSIS — G47.33 OSA (OBSTRUCTIVE SLEEP APNEA): Chronic | ICD-10-CM

## 2023-07-12 DIAGNOSIS — I10 PRIMARY HYPERTENSION: ICD-10-CM

## 2023-07-12 PROCEDURE — 3074F SYST BP LT 130 MM HG: CPT | Performed by: INTERNAL MEDICINE

## 2023-07-12 PROCEDURE — 3078F DIAST BP <80 MM HG: CPT | Performed by: INTERNAL MEDICINE

## 2023-07-12 PROCEDURE — 99214 OFFICE O/P EST MOD 30 MIN: CPT | Performed by: INTERNAL MEDICINE

## 2023-07-12 ASSESSMENT — ENCOUNTER SYMPTOMS
SHORTNESS OF BREATH: 1
ABDOMINAL PAIN: 0
COUGH: 0
BACK PAIN: 0

## 2023-07-12 NOTE — PROGRESS NOTES
01/19/2022     Lab Results   Component Value Date    LABVLDL 41.2 (H) 03/29/2023    LABVLDL 45.2 (H) 09/27/2022    VLDL 29 01/19/2022    VLDL 29 07/19/2021     Lab Results   Component Value Date    CHOLHDLRATIO 3.3 03/29/2023    CHOLHDLRATIO 3.5 09/27/2022        No results found for: LABA1C  No results found for: EAG     Lab Results   Component Value Date     03/29/2023    K 4.3 03/29/2023     03/29/2023    CO2 27 03/29/2023    BUN 14 03/29/2023    CREATININE 0.80 03/29/2023    GLUCOSE 96 03/29/2023    CALCIUM 9.2 03/29/2023    PROT 6.6 03/29/2023    LABALBU 3.4 (L) 03/29/2023    BILITOT 0.2 03/29/2023    ALKPHOS 72 03/29/2023    AST 13 (L) 03/29/2023    ALT 25 03/29/2023    LABGLOM >60 03/29/2023    GFRAA >60 09/27/2022    AGRATIO 1.7 01/19/2022    GLOB 3.2 03/29/2023       Lab Results   Component Value Date/Time     03/29/2023 09:20 AM    K 4.3 03/29/2023 09:20 AM     03/29/2023 09:20 AM    CO2 27 03/29/2023 09:20 AM    BUN 14 03/29/2023 09:20 AM    CREATININE 0.80 03/29/2023 09:20 AM    GLUCOSE 96 03/29/2023 09:20 AM    CALCIUM 9.2 03/29/2023 09:20 AM        Lab Results   Component Value Date    WBC 6.3 03/29/2023    HGB 13.3 03/29/2023    HCT 39.2 03/29/2023    MCV 87.5 03/29/2023     03/29/2023             ASSESSMENT:    Jerilyn Pandya was seen today for shortness of breath and hypertension. Diagnoses and all orders for this visit:    Shortness of breath -patient has episodes of feeling as if she cannot take a full breath. These are random and sporadic in nature but increasing in frequency. Suspicious may be related to underlying arrhythmia such as PVCs. Exam today is normal.  EKG is normal.  Start Magnesium 400mg BID OTC    Palpitations  - Due to PVCs and PACs. Primary hypertension -this has been well controlled. Continue HCTZ 25 mg daily, losartan 100 mg daily Aldactone 50 mg daily. EDITH (obstructive sleep apnea) -noncompliant with CPAP.     Non-seasonal allergic rhinitis,

## 2023-10-05 DIAGNOSIS — Z78.0 MENOPAUSE: ICD-10-CM

## 2023-10-05 DIAGNOSIS — I10 PRIMARY HYPERTENSION: ICD-10-CM

## 2023-10-05 DIAGNOSIS — K21.9 GASTROESOPHAGEAL REFLUX DISEASE WITHOUT ESOPHAGITIS: ICD-10-CM

## 2023-10-06 RX ORDER — SPIRONOLACTONE 50 MG/1
50 TABLET, FILM COATED ORAL DAILY
Qty: 90 TABLET | Refills: 3 | Status: SHIPPED | OUTPATIENT
Start: 2023-10-06

## 2023-10-06 RX ORDER — TRAZODONE HYDROCHLORIDE 100 MG/1
100 TABLET ORAL NIGHTLY
Qty: 90 TABLET | Refills: 3 | Status: SHIPPED | OUTPATIENT
Start: 2023-10-06

## 2023-10-06 RX ORDER — HYDROCHLOROTHIAZIDE 25 MG/1
25 TABLET ORAL DAILY
Qty: 90 TABLET | Refills: 3 | Status: SHIPPED | OUTPATIENT
Start: 2023-10-06

## 2023-10-06 RX ORDER — OMEPRAZOLE 40 MG/1
40 CAPSULE, DELAYED RELEASE ORAL DAILY
Qty: 90 CAPSULE | Refills: 3 | Status: SHIPPED | OUTPATIENT
Start: 2023-10-06

## 2023-10-06 RX ORDER — ESTRADIOL 2 MG/1
2 TABLET ORAL DAILY
Qty: 90 TABLET | Refills: 3 | Status: SHIPPED | OUTPATIENT
Start: 2023-10-06

## 2023-10-06 RX ORDER — LOSARTAN POTASSIUM 100 MG/1
100 TABLET ORAL DAILY
Qty: 90 TABLET | Refills: 3 | Status: SHIPPED | OUTPATIENT
Start: 2023-10-06

## 2023-10-16 ENCOUNTER — TELEPHONE (OUTPATIENT)
Dept: SURGERY | Age: 53
End: 2023-10-16

## 2023-10-16 RX ORDER — CIPROFLOXACIN 500 MG/1
500 TABLET, FILM COATED ORAL 2 TIMES DAILY
Qty: 14 TABLET | Refills: 0 | Status: SHIPPED | OUTPATIENT
Start: 2023-10-16 | End: 2023-10-23

## 2023-10-19 ENCOUNTER — TRANSCRIBE ORDERS (OUTPATIENT)
Dept: SCHEDULING | Age: 53
End: 2023-10-19

## 2023-10-19 DIAGNOSIS — Z12.31 SCREENING MAMMOGRAM FOR HIGH-RISK PATIENT: Primary | ICD-10-CM

## 2023-11-28 ENCOUNTER — HOSPITAL ENCOUNTER (OUTPATIENT)
Dept: MAMMOGRAPHY | Age: 53
Discharge: HOME | End: 2023-12-01
Attending: OBSTETRICS & GYNECOLOGY
Payer: COMMERCIAL

## 2023-11-28 VITALS — WEIGHT: 177 LBS | HEIGHT: 65 IN | BODY MASS INDEX: 29.49 KG/M2

## 2023-11-28 DIAGNOSIS — Z12.31 SCREENING MAMMOGRAM FOR HIGH-RISK PATIENT: ICD-10-CM

## 2023-11-28 PROCEDURE — 77063 BREAST TOMOSYNTHESIS BI: CPT

## 2023-12-26 DIAGNOSIS — K21.9 GASTROESOPHAGEAL REFLUX DISEASE WITHOUT ESOPHAGITIS: ICD-10-CM

## 2023-12-26 RX ORDER — OMEPRAZOLE 40 MG/1
40 CAPSULE, DELAYED RELEASE ORAL DAILY
Qty: 90 CAPSULE | Refills: 3 | Status: SHIPPED | OUTPATIENT
Start: 2023-12-26

## 2024-01-18 ENCOUNTER — OFFICE VISIT (OUTPATIENT)
Age: 54
End: 2024-01-18
Payer: COMMERCIAL

## 2024-01-18 VITALS
WEIGHT: 185 LBS | BODY MASS INDEX: 30.82 KG/M2 | HEIGHT: 65 IN | SYSTOLIC BLOOD PRESSURE: 118 MMHG | DIASTOLIC BLOOD PRESSURE: 64 MMHG | HEART RATE: 98 BPM

## 2024-01-18 DIAGNOSIS — G47.33 OSA (OBSTRUCTIVE SLEEP APNEA): Chronic | ICD-10-CM

## 2024-01-18 DIAGNOSIS — R00.2 PALPITATIONS: ICD-10-CM

## 2024-01-18 DIAGNOSIS — R06.02 SHORTNESS OF BREATH: Primary | Chronic | ICD-10-CM

## 2024-01-18 DIAGNOSIS — J30.1 NON-SEASONAL ALLERGIC RHINITIS DUE TO POLLEN: ICD-10-CM

## 2024-01-18 PROCEDURE — 99214 OFFICE O/P EST MOD 30 MIN: CPT | Performed by: INTERNAL MEDICINE

## 2024-01-18 PROCEDURE — 3074F SYST BP LT 130 MM HG: CPT | Performed by: INTERNAL MEDICINE

## 2024-01-18 PROCEDURE — 3078F DIAST BP <80 MM HG: CPT | Performed by: INTERNAL MEDICINE

## 2024-01-18 ASSESSMENT — ENCOUNTER SYMPTOMS
COUGH: 0
ABDOMINAL PAIN: 0
BACK PAIN: 0
SHORTNESS OF BREATH: 1

## 2024-01-18 NOTE — PROGRESS NOTES
[facility] CARDIOLOGY  [address] [place] [address], [address]  [place] [address]      24      NAME:  Kervin Burleson  : 1970  MRN: [id number]      SUBJECTIVE:   Kervin Burleson is a 53 y.o. female seen for a follow up visit regarding the following:     Chief Complaint   Patient presents with    Shortness of Breath       HPI:   53 y.o. female with long history of dyspnea.   She had normal echocardiogram.  She underwent cardiac catheterization  with normal coronary arteries.  She does have some palpitations and chest fullness during these episodes.  She has had a chest x-ray which is normal.  Lab work which is normal.  She also had pulmonary PFTs demonstrating normal lung function.          Past Medical History, Past Surgical History, Family history, Social History, and Medications were all reviewed with the patient today and updated as necessary.     Current Outpatient Medications   Medication Sig Dispense Refill    omeprazole (PRILOSEC) 40 MG delayed release capsule Take 1 capsule by mouth daily TAKE 1 CAPSULE DAILY 30 min prior to breakfast 90 capsule 3    estradiol (ESTRACE) 2 MG tablet Take 1 tablet by mouth daily TAKE 1 TABLET BY MOUTH EVERY DAY 90 tablet 3    hydroCHLOROthiazide (HYDRODIURIL) 25 MG tablet Take 1 tablet by mouth daily TAKE 1 TABLET BY MOUTH EVERY DAY 90 tablet 3    traZODone (DESYREL) 100 MG tablet Take 1 tablet by mouth nightly 1 tablet qhs. 90 tablet 3    losartan (COZAAR) 100 MG tablet Take 1 tablet by mouth daily 90 tablet 3    spironolactone (ALDACTONE) 50 MG tablet Take 1 tablet by mouth daily 90 tablet 3    ipratropium (ATROVENT) 0.03 % nasal spray 2 sprays by Each Nostril route every 8 hours as needed for Rhinitis 1 each 5    fexofenadine (ALLEGRA) 180 MG tablet Take 1 tablet by mouth daily 90 tablet 3    azelastine HCl 0.15 % SOLN 2 sprays by Nasal route 2 times daily 1 mL 5    fluticasone (FLONASE) 50 MCG/ACT nasal spray 2 sprays by Nasal route daily 16 g 5    vitamin D

## 2024-01-23 ENCOUNTER — TELEMEDICINE (OUTPATIENT)
Dept: FAMILY MEDICINE CLINIC | Facility: CLINIC | Age: 54
End: 2024-01-23
Payer: COMMERCIAL

## 2024-01-23 DIAGNOSIS — F41.9 ANXIETY: Primary | ICD-10-CM

## 2024-01-23 PROCEDURE — 99213 OFFICE O/P EST LOW 20 MIN: CPT | Performed by: PHYSICIAN ASSISTANT

## 2024-01-23 RX ORDER — BUSPIRONE HYDROCHLORIDE 5 MG/1
5 TABLET ORAL
Qty: 30 TABLET | Refills: 1 | Status: SHIPPED | OUTPATIENT
Start: 2024-01-23

## 2024-01-23 RX ORDER — FLUOXETINE 10 MG/1
10 CAPSULE ORAL DAILY
Qty: 30 CAPSULE | Refills: 1 | Status: SHIPPED | OUTPATIENT
Start: 2024-01-23

## 2024-01-23 ASSESSMENT — PATIENT HEALTH QUESTIONNAIRE - PHQ9
SUM OF ALL RESPONSES TO PHQ QUESTIONS 1-9: 0
1. LITTLE INTEREST OR PLEASURE IN DOING THINGS: 0
SUM OF ALL RESPONSES TO PHQ9 QUESTIONS 1 & 2: 0
2. FEELING DOWN, DEPRESSED OR HOPELESS: 0
SUM OF ALL RESPONSES TO PHQ QUESTIONS 1-9: 0

## 2024-01-23 NOTE — PROGRESS NOTES
DAILY 30 min prior to breakfast 90 capsule 3    estradiol (ESTRACE) 2 MG tablet Take 1 tablet by mouth daily TAKE 1 TABLET BY MOUTH EVERY DAY 90 tablet 3    hydroCHLOROthiazide (HYDRODIURIL) 25 MG tablet Take 1 tablet by mouth daily TAKE 1 TABLET BY MOUTH EVERY DAY 90 tablet 3    traZODone (DESYREL) 100 MG tablet Take 1 tablet by mouth nightly 1 tablet qhs. 90 tablet 3    losartan (COZAAR) 100 MG tablet Take 1 tablet by mouth daily 90 tablet 3    spironolactone (ALDACTONE) 50 MG tablet Take 1 tablet by mouth daily 90 tablet 3    ipratropium (ATROVENT) 0.03 % nasal spray 2 sprays by Each Nostril route every 8 hours as needed for Rhinitis 1 each 5    fexofenadine (ALLEGRA) 180 MG tablet Take 1 tablet by mouth daily 90 tablet 3    azelastine HCl 0.15 % SOLN 2 sprays by Nasal route 2 times daily 1 mL 5    fluticasone (FLONASE) 50 MCG/ACT nasal spray 2 sprays by Nasal route daily 16 g 5    vitamin D (CHOLECALCIFEROL) 55895 UNIT CAPS Take 1 capsule by mouth every 7 days 12 capsule 2    ibuprofen (ADVIL;MOTRIN) 200 MG tablet Take 1 tablet by mouth as needed       No current facility-administered medications for this visit.         Current Problem List:   Patient Active Problem List   Diagnosis    Breast mass seen on mammogram    Non-seasonal allergic rhinitis    Primary insomnia    HTN (hypertension)    Vitamin D deficiency    Cervical radiculopathy    Edema    Thyroid nodule    Allergic rhinitis due to pollen    Gastroesophageal reflux disease without esophagitis    Palpitations    Shortness of breath    EDITH (obstructive sleep apnea)       Social History:   Social History     Tobacco Use    Smoking status: Never    Smokeless tobacco: Never   Substance Use Topics    Alcohol use: Yes     Alcohol/week: 1.0 standard drink of alcohol     Comment: occ       Family History:   Family History   Problem Relation Age of Onset    Hypertension Mother     Lung Cancer Father     No Known Problems Brother     Hypertension Maternal

## 2024-04-01 ENCOUNTER — PATIENT MESSAGE (OUTPATIENT)
Dept: FAMILY MEDICINE CLINIC | Facility: CLINIC | Age: 54
End: 2024-04-01

## 2024-04-01 DIAGNOSIS — M25.551 RIGHT HIP PAIN: Primary | ICD-10-CM

## 2024-04-02 NOTE — TELEPHONE ENCOUNTER
From: Kervin Burleson  To: Paige Acuña  Sent: 4/1/2024 11:38 AM EDT  Subject: Referral    Emery Gibson,  Can you send a referral to Dr. Villeda at [facility] for me for hip pain please? My right hip has been painful for about 3 weeks now.   Thanks,  Kervin

## 2024-04-04 ENCOUNTER — HOSPITAL ENCOUNTER (OUTPATIENT)
Dept: LAB | Age: 54
Setting detail: SPECIMEN
Discharge: HOME | End: 2024-04-07

## 2024-04-04 PROCEDURE — 36415 COLL VENOUS BLD VENIPUNCTURE: CPT

## 2024-04-04 PROCEDURE — 86765 RUBEOLA ANTIBODY: CPT

## 2024-04-04 PROCEDURE — 86706 HEP B SURFACE ANTIBODY: CPT

## 2024-04-04 PROCEDURE — 86762 RUBELLA ANTIBODY: CPT

## 2024-04-04 PROCEDURE — 86735 MUMPS ANTIBODY: CPT

## 2024-04-04 PROCEDURE — 86787 VARICELLA-ZOSTER ANTIBODY: CPT

## 2024-04-24 ENCOUNTER — OFFICE VISIT (OUTPATIENT)
Dept: ORTHOPEDIC SURGERY | Age: 54
End: 2024-04-24
Payer: COMMERCIAL

## 2024-04-24 DIAGNOSIS — M25.551 RIGHT HIP PAIN: Primary | ICD-10-CM

## 2024-04-24 PROCEDURE — 99203 OFFICE O/P NEW LOW 30 MIN: CPT | Performed by: PHYSICIAN ASSISTANT

## 2024-04-24 RX ORDER — CELECOXIB 200 MG/1
200 CAPSULE ORAL 2 TIMES DAILY
Qty: 60 CAPSULE | Refills: 1 | Status: SHIPPED | OUTPATIENT
Start: 2024-04-24

## 2024-04-24 NOTE — PROGRESS NOTES
[facility]          Patient ID:  Name: Kervin Burleson  AGE/Gender: 53 y.o. female  MRN: [id number]  : 1970    Date of Consultation:  2024          ALLERGIES:   Allergies   Allergen Reactions    Metronidazole Rash            History:  The patient presents today as a new patient complaining of   right hip pain.  This has been going on for 7 weeks. They describe the pain as constant and bothers her at night as well.  She has pain with activity.. They have pain going up and down stairs and pain with prolong walking. They rate the pain as a 6 out of 10.  The patient is limited in their activities because of this pain.    They deny any injuries. The patient has tried Advil.  They have no other complaints or concerns.  She is planning a trip to [facility] in [place] for next week and would like to try to get something to help her before her trip.    Review of Systems:  Pertinent items are noted in HPI.    Past Medical History Includes:   Past Medical History:   Diagnosis Date    Allergic rhinitis due to pollen     Cervical radiculopathy     GERD (gastroesophageal reflux disease)     Hypertension     Insomnia     Major depression     Menopause     Obstructive sleep apnea on CPAP     Onychomycosis     Thyroid nodule    ,   Past Surgical History:   Procedure Laterality Date    BREAST BIOPSY Bilateral     BREAST LUMPECTOMY Left     benign    CARDIAC CATHETERIZATION      Benign per pt    COLONOSCOPY          HEENT      sinus surgery x 3    HYSTERECTOMY, TOTAL ABDOMINAL (CERVIX REMOVED)      LYMPHADENECTOMY      left axilla lymph nodes    SALPINGO-OOPHORECTOMY  1990s    SALPINGO-OOPHORECTOMY         Family History:   Family History   Problem Relation Age of Onset    Hypertension Mother     Lung Cancer Father     No Known Problems Brother     Hypertension Maternal Grandmother     Heart Disease Maternal Grandmother     Diabetes Maternal Grandmother     Osteoarthritis Maternal

## 2024-05-08 DIAGNOSIS — M25.551 RIGHT HIP PAIN: Primary | ICD-10-CM

## 2024-05-16 DIAGNOSIS — M25.551 RIGHT HIP PAIN: Primary | ICD-10-CM

## 2024-05-16 RX ORDER — ALPRAZOLAM 0.5 MG/1
0.5 TABLET ORAL ONCE
Qty: 1 TABLET | Refills: 0 | Status: SHIPPED | OUTPATIENT
Start: 2024-05-16 | End: 2024-05-16

## 2024-05-28 ENCOUNTER — HOSPITAL ENCOUNTER (OUTPATIENT)
Dept: MRI IMAGING | Age: 54
Discharge: HOME | End: 2024-05-31
Payer: COMMERCIAL

## 2024-05-28 DIAGNOSIS — M25.551 RIGHT HIP PAIN: ICD-10-CM

## 2024-05-28 PROCEDURE — 73721 MRI JNT OF LWR EXTRE W/O DYE: CPT

## 2024-05-30 ENCOUNTER — PATIENT MESSAGE (OUTPATIENT)
Dept: ORTHOPEDIC SURGERY | Age: 54
End: 2024-05-30

## 2024-05-30 DIAGNOSIS — M25.551 RIGHT HIP PAIN: Primary | ICD-10-CM

## 2024-06-06 ENCOUNTER — OFFICE VISIT (OUTPATIENT)
Dept: ORTHOPEDIC SURGERY | Age: 54
End: 2024-06-06
Payer: COMMERCIAL

## 2024-06-06 DIAGNOSIS — M25.551 GREATER TROCHANTERIC PAIN SYNDROME OF RIGHT LOWER EXTREMITY: ICD-10-CM

## 2024-06-06 DIAGNOSIS — M16.11 PRIMARY OSTEOARTHRITIS OF RIGHT HIP: Primary | ICD-10-CM

## 2024-06-06 PROCEDURE — 20611 DRAIN/INJ JOINT/BURSA W/US: CPT | Performed by: STUDENT IN AN ORGANIZED HEALTH CARE EDUCATION/TRAINING PROGRAM

## 2024-06-06 PROCEDURE — 99204 OFFICE O/P NEW MOD 45 MIN: CPT | Performed by: STUDENT IN AN ORGANIZED HEALTH CARE EDUCATION/TRAINING PROGRAM

## 2024-06-06 RX ORDER — METHYLPREDNISOLONE ACETATE 40 MG/ML
40 INJECTION, SUSPENSION INTRA-ARTICULAR; INTRALESIONAL; INTRAMUSCULAR; SOFT TISSUE ONCE
Status: COMPLETED | OUTPATIENT
Start: 2024-06-06 | End: 2024-06-06

## 2024-06-06 RX ADMIN — METHYLPREDNISOLONE ACETATE 40 MG: 40 INJECTION, SUSPENSION INTRA-ARTICULAR; INTRALESIONAL; INTRAMUSCULAR; SOFT TISSUE at 11:55

## 2024-06-06 NOTE — PROGRESS NOTES
Name: Kervin Burleson  YOB: 1970  Gender: female  MRN: [id number]  Date of Encounter:  6/6/2024       CHIEF COMPLAINT:     Chief Complaint   Patient presents with    Hip Pain     Right        SUBJECTIVE/OBJECTIVE:      HPI:    Patient is a 53 y.o. pleasant female who presents today for a new evaluation of her right hip pain.     Kervin presents for right hip pain that has been progressive for the past 6 months.  She describes the pain with the use of C-spine but has pain laterally when laying on her side.  She feels sharp pain at times as well as stiffness.  She also feels a snapping.  She has decreased pain with walking but worsening issues in the morning, laying on her side, and going downstairs her leg gets shaky.  She has had 1 buckling episode.  She denies any locking.  She has tried Celebrex without relief.  She has not done any physical therapy and has had no injections.  She has an MRI which is available for review.  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   Pertinent history:   Tobacco use:  reports that she has never smoked. She has never used smokeless tobacco.  Diabetes: none  Anticoagulation: no      REVIEW OF SYSTEMS:   As noted in HPI.     PHYSICAL EXAMINATION:     Gen: Well-developed, no acute distress   HEENT: NC/AT, EOMI   Neck: Trachea midline, normal ROM   CV: Regular rhythm by palpation of distal pulse, normal capillary refill   Pulm: No respiratory distress, no stridor   Psychiatric: Well oriented, normal mood and affect.   Skin: No rashes, lesions or ulcers, normal temperature, turgor, and texture on uninvolved extremity.      ORTHO EXAM:    Right Hip:      Inspection: No deformity, No erythema  ROM: 100 flexion, 25 internal rotation, 50 external rotation, 20 extension  Tenderness: Moderate tenderness to greater trochanteric bursa, distal gluteus medius and minimus.  Strength:

## 2024-06-14 ENCOUNTER — EVALUATION (OUTPATIENT)
Age: 54
End: 2024-06-14
Payer: COMMERCIAL

## 2024-06-14 DIAGNOSIS — M25.551 RIGHT HIP PAIN: Primary | ICD-10-CM

## 2024-06-14 DIAGNOSIS — M25.651 HIP JOINT STIFFNESS, RIGHT: ICD-10-CM

## 2024-06-14 DIAGNOSIS — M62.81 MUSCLE WEAKNESS: ICD-10-CM

## 2024-06-14 DIAGNOSIS — M25.551 GREATER TROCHANTERIC PAIN SYNDROME OF RIGHT LOWER EXTREMITY: ICD-10-CM

## 2024-06-14 DIAGNOSIS — M16.11 PRIMARY OSTEOARTHRITIS OF RIGHT HIP: ICD-10-CM

## 2024-06-14 PROCEDURE — 97140 MANUAL THERAPY 1/> REGIONS: CPT | Performed by: PHYSICAL THERAPIST

## 2024-06-14 PROCEDURE — 97110 THERAPEUTIC EXERCISES: CPT | Performed by: PHYSICAL THERAPIST

## 2024-06-14 PROCEDURE — 97162 PT EVAL MOD COMPLEX 30 MIN: CPT | Performed by: PHYSICAL THERAPIST

## 2024-06-14 NOTE — PROGRESS NOTES
[facility] ORTHOPAEDICS  [address] [place] [id number]  Dept: [phone]      Physical Therapy Initial Assessment     Referring MD: Huong Guy MD  Diagnosis:     ICD-10-CM    1. Right hip pain  M25.551       2. Hip joint stiffness, right  M25.651       3. Muscle weakness  M62.81          Therapy precautions:None  Co-morbidities affecting plan of care: B sciatica affecting both legs with pain buttock to lateral thigh/lower leg (most recent bout ~ 1 month ago R side- feels very different than current symptoms with nerve type pain)    Payor: Payor: R /  /  /  Billing pattern: Commercial- substantial/midpoint time each CPT  Total Timed Procedure Codes: 25 min, Total Time: 55 min Modifier needed: No  Episode visit count:  1     PERTINENT MEDICAL HISTORY     Past medical and surgical history:   Past Medical History:   Diagnosis Date    Allergic rhinitis due to pollen     Cervical radiculopathy     GERD (gastroesophageal reflux disease)     Hypertension     Insomnia     Major depression     Menopause     Obstructive sleep apnea on CPAP     Onychomycosis     Thyroid nodule       Past Surgical History:   Procedure Laterality Date    BREAST BIOPSY Bilateral     BREAST LUMPECTOMY Left     benign    CARDIAC CATHETERIZATION      Benign per pt    COLONOSCOPY      11/19    HEENT      sinus surgery x 3    HYSTERECTOMY, TOTAL ABDOMINAL (CERVIX REMOVED)  1998    LYMPHADENECTOMY      left axilla lymph nodes    SALPINGO-OOPHORECTOMY  1990s    SALPINGO-OOPHORECTOMY  1998     Medications: reviewed in chart   Allergies:   Allergies   Allergen Reactions    Metronidazole Rash        Diagnostic exams (per chart review Dr. Guy's note 6/6/24):   X-ray right hip 3 vw pelvis / AP / frog leg for hip pain taken previously by another provider reviewed  Findings: No significant degenerative changes of the hip normal.  No pincer or cam lesion.  No dysplasia.  Impression normal x-ray right hip     I independently  Seen by patient Larry Cornell on 11/21/2023 12:41 PM

## 2024-06-17 ENCOUNTER — TREATMENT (OUTPATIENT)
Age: 54
End: 2024-06-17
Payer: COMMERCIAL

## 2024-06-17 DIAGNOSIS — M25.651 HIP JOINT STIFFNESS, RIGHT: ICD-10-CM

## 2024-06-17 DIAGNOSIS — M25.551 GREATER TROCHANTERIC PAIN SYNDROME OF RIGHT LOWER EXTREMITY: ICD-10-CM

## 2024-06-17 DIAGNOSIS — M16.11 PRIMARY OSTEOARTHRITIS OF RIGHT HIP: ICD-10-CM

## 2024-06-17 DIAGNOSIS — M62.81 MUSCLE WEAKNESS: ICD-10-CM

## 2024-06-17 DIAGNOSIS — M25.551 RIGHT HIP PAIN: Primary | ICD-10-CM

## 2024-06-17 PROCEDURE — 97140 MANUAL THERAPY 1/> REGIONS: CPT | Performed by: PHYSICAL THERAPIST

## 2024-06-17 PROCEDURE — 97110 THERAPEUTIC EXERCISES: CPT | Performed by: PHYSICAL THERAPIST

## 2024-06-17 NOTE — PROGRESS NOTES
[facility]  [address] [place] [id number]  Dept: [phone]      Physical Therapy Daily Note     Referring MD: Huong Guy MD  Diagnosis:     ICD-10-CM    1. Right hip pain  M25.551       2. Hip joint stiffness, right  M25.651       3. Muscle weakness  M62.81       4. Greater trochanteric pain syndrome of right lower extremity [M25.551]  M25.551       5. Primary osteoarthritis of right hip [M16.11]  M16.11          Therapy precautions:None  Co-morbidities affecting plan of care: B sciatica affecting both legs with pain buttock to lateral thigh/lower leg (most recent bout ~ 1 month ago R side- feels very different than current symptoms with nerve type pain)  Chief complaints/history of injury: Insidious onset progressive R hip pain over the past 6 months. Pt tried Celebrex without relief. Pt saw Dr. Guy 6/6/24 with assessment of multifactorial R hip pain with R hip joint related discomfort and mild arthritic changes per MRI with lateral hip pain related to greater trochanteric pain syndrome of RLE. Pt received a R trochanteric bursa injection with moderate relief of lateral hip pain only- symptoms are starting to return. Pt will follow up with Dr. Guy next week and may receive an intra-articular injection. Pt notes onset of L hip pain over the past few weeks which may be due to limping.   Describe current symptoms: Pain R hip consistent with c-sign, lateral R hip pain when lying on side, stiffness R hip, snapping R hip (anterior), 1 buckling incident (just standing), denies locking. Some L hip pain.   Patient Stated Goals: pain relief, walk without a limp    Payor: Payor: UMR /  /  /  Billing pattern: Commercial- substantial/midpoint time each CPT   Total Timed Procedure Codes: 40 min, Total Time: 40 min Modifier needed: No  Episode visit count:  2     SUBJECTIVE     Pt reports hip is aggravated- mostly deep in the joint and some lateral.  R hip feels okay.

## 2024-06-21 NOTE — PROGRESS NOTES
Name: Kervin Burleson  YOB: 1970  Gender: female  MRN: [id number]  Date of Encounter:  6/24/2024       CHIEF COMPLAINT:     Chief Complaint   Patient presents with    Follow-up     R Hip        SUBJECTIVE/OBJECTIVE:      HPI:    Patient is a 53 y.o. pleasant female who presents today for a return evaluation of her right hip.    Working diagnosis:   1. Greater trochanteric pain syndrome of right lower extremity    2. Primary osteoarthritis of right hip       LOV: 6/6/2024     Recall Hx:  Kervin presents for right hip pain that has been progressive for the past 6 months.  She describes the pain with the use of C-spine but has pain laterally when laying on her side.  She feels sharp pain at times as well as stiffness.  She also feels a snapping.  She has decreased pain with walking but worsening issues in the morning, laying on her side, and going downstairs her leg gets shaky.  She has had 1 buckling episode.  She denies any locking.  She has tried Celebrex without relief.  She has not done any physical therapy and has had no injections.  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    Previous MR of hip shows mild arthritis, gluteus med/min tendinosis. Hip pain is multifactorial, both greater trochanteric pain and joint pain.     6/6/24: Recommended and performed a troch bursa injection. Provided with a formal therapy referral.  6/24/24: She feels moderately better after the greater troch injection. She has been to 3 PT sessions thus far and has one today with anticipated dry needling. She still has pain in the groin, not severe.        PAST HISTORY:   Past medical, surgical, family, social history and allergies reviewed by me. Unchanged from prior visit.     REVIEW OF SYSTEMS:   As noted in HPI.     PHYSICAL EXAMINATION:     Gen: Well-developed, no acute distress   HEENT: NC/AT, EOMI   Neck: Trachea midline, normal ROM   CV: Regular rhythm by palpation of distal pulse, normal capillary refill   Pulm:

## 2024-06-24 ENCOUNTER — TREATMENT (OUTPATIENT)
Age: 54
End: 2024-06-24
Payer: COMMERCIAL

## 2024-06-24 ENCOUNTER — OFFICE VISIT (OUTPATIENT)
Dept: ORTHOPEDIC SURGERY | Age: 54
End: 2024-06-24
Payer: COMMERCIAL

## 2024-06-24 DIAGNOSIS — M16.11 PRIMARY OSTEOARTHRITIS OF RIGHT HIP: ICD-10-CM

## 2024-06-24 DIAGNOSIS — M62.81 MUSCLE WEAKNESS: ICD-10-CM

## 2024-06-24 DIAGNOSIS — M25.551 RIGHT HIP PAIN: Primary | ICD-10-CM

## 2024-06-24 DIAGNOSIS — M25.551 GREATER TROCHANTERIC PAIN SYNDROME OF RIGHT LOWER EXTREMITY: ICD-10-CM

## 2024-06-24 DIAGNOSIS — M25.551 GREATER TROCHANTERIC PAIN SYNDROME OF RIGHT LOWER EXTREMITY: Primary | ICD-10-CM

## 2024-06-24 DIAGNOSIS — M25.651 HIP JOINT STIFFNESS, RIGHT: ICD-10-CM

## 2024-06-24 PROCEDURE — MISCDN1 MISCDN1: Performed by: PHYSICAL THERAPIST

## 2024-06-24 PROCEDURE — 97140 MANUAL THERAPY 1/> REGIONS: CPT | Performed by: PHYSICAL THERAPIST

## 2024-06-24 PROCEDURE — 97110 THERAPEUTIC EXERCISES: CPT | Performed by: PHYSICAL THERAPIST

## 2024-06-24 PROCEDURE — 99213 OFFICE O/P EST LOW 20 MIN: CPT | Performed by: STUDENT IN AN ORGANIZED HEALTH CARE EDUCATION/TRAINING PROGRAM

## 2024-06-24 NOTE — PROGRESS NOTES
[facility]    Exercises  - Supine Transversus Abdominis Bracing - Hands on Stomach  - 2 x daily - 1 sets - 20-30 reps - 5 hold  - Hip Flexor Stretch at Edge of Bed  - 1 x daily - 1 sets - 3 reps - 30 hold  - Supine Figure 4 Piriformis Stretch  - 1-2 x daily - 1 sets - 2 reps - 30 hold  - Supine Piriformis Stretch with Foot on Ground  - 1-2 x daily - 1 sets - 2 reps - 30 hold  - Supine Hamstring Stretch with Strap  - 1-2 x daily - 1 sets - 3 reps - 30  hold  - Beginner Bridge  - 5 x weekly - 1 sets - 3-5 reps - 10 hold  - Prone Heel Squeeze  - 5 x weekly - 2 sets - 10 reps - 5 hold  - Clamshell  - 5 x weekly - 1-2 sets - 10 reps - 3 hold

## 2024-06-26 ENCOUNTER — TREATMENT (OUTPATIENT)
Age: 54
End: 2024-06-26
Payer: COMMERCIAL

## 2024-06-26 DIAGNOSIS — M16.11 PRIMARY OSTEOARTHRITIS OF RIGHT HIP: ICD-10-CM

## 2024-06-26 DIAGNOSIS — M62.81 MUSCLE WEAKNESS: ICD-10-CM

## 2024-06-26 DIAGNOSIS — M25.651 HIP JOINT STIFFNESS, RIGHT: ICD-10-CM

## 2024-06-26 DIAGNOSIS — M25.551 GREATER TROCHANTERIC PAIN SYNDROME OF RIGHT LOWER EXTREMITY: ICD-10-CM

## 2024-06-26 DIAGNOSIS — M25.551 RIGHT HIP PAIN: Primary | ICD-10-CM

## 2024-06-26 PROCEDURE — 97110 THERAPEUTIC EXERCISES: CPT | Performed by: PHYSICAL THERAPIST

## 2024-06-26 PROCEDURE — 97140 MANUAL THERAPY 1/> REGIONS: CPT | Performed by: PHYSICAL THERAPIST

## 2024-06-26 NOTE — PROGRESS NOTES
[facility]  [address] [place] [id number]  Dept: [phone]      Physical Therapy Daily Note     Referring MD: Huong Guy MD  Diagnosis:     ICD-10-CM    1. Right hip pain  M25.551       2. Hip joint stiffness, right  M25.651       3. Muscle weakness  M62.81       4. Greater trochanteric pain syndrome of right lower extremity [M25.551]  M25.551       5. Primary osteoarthritis of right hip [M16.11]  M16.11          Therapy precautions:None  Co-morbidities affecting plan of care: B sciatica affecting both legs with pain buttock to lateral thigh/lower leg (most recent bout ~ 1 month ago R side- feels very different than current symptoms with nerve type pain)  Chief complaints/history of injury: Insidious onset progressive R hip pain over the past 6 months. Pt tried Celebrex without relief. Pt saw Dr. Guy 6/6/24 with assessment of multifactorial R hip pain with R hip joint related discomfort and mild arthritic changes per MRI with lateral hip pain related to greater trochanteric pain syndrome of RLE. Pt received a R trochanteric bursa injection with moderate relief of lateral hip pain only- symptoms are starting to return. Pt will follow up with Dr. Guy next week and may receive an intra-articular injection. Pt notes onset of L hip pain over the past few weeks which may be due to limping.   Describe current symptoms: Pain R hip consistent with c-sign, lateral R hip pain when lying on side, stiffness R hip, snapping R hip (anterior), 1 buckling incident (just standing), denies locking. Some L hip pain.   Patient Stated Goals: pain relief, walk without a limp    Payor: Payor: UMR /  /  /  Billing pattern: Commercial- substantial/midpoint time each CPT   Total Timed Procedure Codes: 48 min, Total Time: 48 min Modifier needed: No  Episode visit count:  4     SUBJECTIVE     Pt reports symptoms were aggravated after dry needling. It is feeling a little better today. Sciatica

## 2024-07-01 ENCOUNTER — TREATMENT (OUTPATIENT)
Age: 54
End: 2024-07-01
Payer: COMMERCIAL

## 2024-07-01 DIAGNOSIS — M25.551 RIGHT HIP PAIN: Primary | ICD-10-CM

## 2024-07-01 DIAGNOSIS — M62.81 MUSCLE WEAKNESS: ICD-10-CM

## 2024-07-01 DIAGNOSIS — M25.551 GREATER TROCHANTERIC PAIN SYNDROME OF RIGHT LOWER EXTREMITY: ICD-10-CM

## 2024-07-01 DIAGNOSIS — M25.651 HIP JOINT STIFFNESS, RIGHT: ICD-10-CM

## 2024-07-01 PROCEDURE — 97110 THERAPEUTIC EXERCISES: CPT | Performed by: PHYSICAL THERAPIST

## 2024-07-01 PROCEDURE — 97140 MANUAL THERAPY 1/> REGIONS: CPT | Performed by: PHYSICAL THERAPIST

## 2024-07-01 NOTE — PROGRESS NOTES
provided today:  Therapeutic exercise (74857) x 28 min to develop ROM, strength, endurance and flexibility BLEs with focus on the R.  Nustep level 5 x 5 minSlow quadruped rocking Isometric bird dog in quadruped H5sec x 10 BQuadruped hip extension (knee flexed) H5sec x 5 BHooklying bridge H20sec x 3R modified nik stretch with gentle STM proximal quadriceps/psoas  Prone heel squeeze H5sec, 2x10  Prone hip ER/IR x 25  Sidelying clamshell isometric, medium booty band, H10sec x 5 R  R hip IR/ER on stool x 10  Standing R psoas stretch- instructed in to perform during work day  Manual therapy (48484) x 10 min utilizing techniques to improve joint and/or soft tissue mobility, ROM, and function as well as helping to decrease pain/spasms and swelling.  Palpation and assessment of soft tissue, muscles, and landmarks   Long axis traction RLE  Grade II-III posterior R hip mobs  Belted lateral and inferior R hip mobs  [place] in prone     ASSESSMENT     Less antalgia noted in gait today. Pt tolerated progressed quadruped exercises well. Muscle soreness in R glutes noted after session but no increase in pain.    PLAN     Continue manual therapy and progress exercises, progress note .   Effective Dates/Duration: 6/14/2024 TO 7/26/2024  (42 days).    Frequency: 2x/week   Interventions may include but are not limited to:   (99035) Therapeutic exercise to develop ROM, strength, endurance and flexibility  (66243) Therapeutic activities using dynamic activities to improve function  (60830) Manual therapy techniques to improve joint and/or soft tissue mobility, ROM, and function as well as helping to decrease pain/spasms and swelling  (11926) Neuromuscular reeducation addressing impaired balance, coordination, kinesthetic sense, posture and proprioception  (20560/20561) Dry needling for the management of neuromusculoskeletal pain and movement impairment  Home exercise program (HEP) development      GOALS     Short term goals to

## 2024-07-12 ENCOUNTER — TREATMENT (OUTPATIENT)
Age: 54
End: 2024-07-12

## 2024-07-12 DIAGNOSIS — M16.11 PRIMARY OSTEOARTHRITIS OF RIGHT HIP: ICD-10-CM

## 2024-07-12 DIAGNOSIS — M62.81 MUSCLE WEAKNESS: ICD-10-CM

## 2024-07-12 DIAGNOSIS — M25.551 RIGHT HIP PAIN: Primary | ICD-10-CM

## 2024-07-12 DIAGNOSIS — M25.651 HIP JOINT STIFFNESS, RIGHT: ICD-10-CM

## 2024-07-12 DIAGNOSIS — M25.551 GREATER TROCHANTERIC PAIN SYNDROME OF RIGHT LOWER EXTREMITY: ICD-10-CM

## 2024-07-12 NOTE — PROGRESS NOTES
hip pain by 50-60% and improved walking/stairs. Function per HOS improved from 62% to 81%. Objective findings revealed improved R hip rotation AROM, improved B LE and core strength, and improved gait mechanics with less antalgia. Continued deficits include: Pain  ROM limitations  Soft tissue restrictions  Strength deficits  Impaired gait  Impaired balance/proprioception  Restricted social activity  Restricted recreational participation. Pt has not achieved max rehab potential and would benefit from continued skilled PT to address the above impairments and continue progress towards remaining therapy goals.      PLAN     Continue manual therapy and progress exercise to standing .  Effective Dates/Duration: 6/14/2024 TO 7/26/2024  (42 days).    Frequency: 2x/week   Interventions may include but are not limited to:   (52174) Therapeutic exercise to develop ROM, strength, endurance and flexibility  (61942) Therapeutic activities using dynamic activities to improve function  (77710) Manual therapy techniques to improve joint and/or soft tissue mobility, ROM, and function as well as helping to decrease pain/spasms and swelling  (63555) Neuromuscular reeducation addressing impaired balance, coordination, kinesthetic sense, posture and proprioception  (20560/20561) Dry needling for the management of neuromusculoskeletal pain and movement impairment  Home exercise program (HEP) development      GOALS     Short term goals to be met by 7/5/2024  (3 weeks):  Patient will demonstrate good recall of HEP requiring no more than minimal verbal cuing for proper form and technique. Goal Met 7/12/2024   Pt will increase lower abdominal strength to at least 4-/5 for improved spinal stabilization with functional activity. Goal Met 7/12/2024   Pt will increase strength to at least 4/5 B hips for improved stability in gait. Goal Met 7/12/2024   Pt will consistently walk up steps in a reciprocal pattern and </= 2 point increase in R hip

## 2024-07-15 ENCOUNTER — TELEPHONE (OUTPATIENT)
Age: 54
End: 2024-07-15

## 2024-07-15 NOTE — TELEPHONE ENCOUNTER
Pt did not show for their scheduled therapy appointment today.    Reason:  forgot  Communication: Pt did not realize that she had an appt today. Confirmed appt for Wed 7/17/24 at 4:05 pm.

## 2024-07-17 ENCOUNTER — TREATMENT (OUTPATIENT)
Age: 54
End: 2024-07-17
Payer: COMMERCIAL

## 2024-07-17 DIAGNOSIS — M25.651 HIP JOINT STIFFNESS, RIGHT: ICD-10-CM

## 2024-07-17 DIAGNOSIS — M25.551 GREATER TROCHANTERIC PAIN SYNDROME OF RIGHT LOWER EXTREMITY: ICD-10-CM

## 2024-07-17 DIAGNOSIS — M16.11 PRIMARY OSTEOARTHRITIS OF RIGHT HIP: ICD-10-CM

## 2024-07-17 DIAGNOSIS — M62.81 MUSCLE WEAKNESS: ICD-10-CM

## 2024-07-17 DIAGNOSIS — M25.551 RIGHT HIP PAIN: Primary | ICD-10-CM

## 2024-07-17 PROCEDURE — 97110 THERAPEUTIC EXERCISES: CPT | Performed by: PHYSICAL THERAPIST

## 2024-07-17 PROCEDURE — 97140 MANUAL THERAPY 1/> REGIONS: CPT | Performed by: PHYSICAL THERAPIST

## 2024-07-17 NOTE — PROGRESS NOTES
[facility]  [address] [place] [id number]  Dept: [phone]      Physical Therapy Daily Note     Referring MD: Huong Guy MD  Diagnosis:     ICD-10-CM    1. Right hip pain  M25.551       2. Hip joint stiffness, right  M25.651       3. Muscle weakness  M62.81       4. Greater trochanteric pain syndrome of right lower extremity [M25.551]  M25.551       5. Primary osteoarthritis of right hip [M16.11]  M16.11          Therapy precautions:None  Co-morbidities affecting plan of care: B sciatica affecting both legs with pain buttock to lateral thigh/lower leg (most recent bout ~ 1 month ago R side- feels very different than current symptoms with nerve type pain)  Chief complaints/history of injury: Insidious onset progressive R hip pain over the past 6 months. Pt tried Celebrex without relief. Pt saw Dr. Guy 6/6/24 with assessment of multifactorial R hip pain with R hip joint related discomfort and mild arthritic changes per MRI with lateral hip pain related to greater trochanteric pain syndrome of RLE. Pt received a R trochanteric bursa injection with moderate relief of lateral hip pain only- symptoms are starting to return. Pt will follow up with Dr. Guy next week and may receive an intra-articular injection. Pt notes onset of L hip pain over the past few weeks which may be due to limping.   Describe current symptoms: Pain R hip consistent with c-sign, lateral R hip pain when lying on side, stiffness R hip, snapping R hip (anterior), 1 buckling incident (just standing), denies locking. Some L hip pain.   Patient Stated Goals: pain relief, walk without a limp    Payor: Payor: UMR /  /  /  Billing pattern: Commercial- substantial/midpoint time each CPT   Total Timed Procedure Codes: 40 min, Total Time: 40 min Modifier needed: No  Episode visit count:  7     SUBJECTIVE     Pt reports no significant changes since last session. Hip has improved overall since beginning therapy

## 2024-07-24 ENCOUNTER — TREATMENT (OUTPATIENT)
Age: 54
End: 2024-07-24
Payer: COMMERCIAL

## 2024-07-24 DIAGNOSIS — M16.11 PRIMARY OSTEOARTHRITIS OF RIGHT HIP: ICD-10-CM

## 2024-07-24 DIAGNOSIS — M25.651 HIP JOINT STIFFNESS, RIGHT: ICD-10-CM

## 2024-07-24 DIAGNOSIS — M25.551 GREATER TROCHANTERIC PAIN SYNDROME OF RIGHT LOWER EXTREMITY: ICD-10-CM

## 2024-07-24 DIAGNOSIS — M62.81 MUSCLE WEAKNESS: ICD-10-CM

## 2024-07-24 DIAGNOSIS — M25.551 RIGHT HIP PAIN: Primary | ICD-10-CM

## 2024-07-24 PROCEDURE — 97140 MANUAL THERAPY 1/> REGIONS: CPT | Performed by: PHYSICAL THERAPIST

## 2024-07-24 PROCEDURE — 97110 THERAPEUTIC EXERCISES: CPT | Performed by: PHYSICAL THERAPIST

## 2024-07-24 NOTE — PROGRESS NOTES
progression.  Pt will increase lower abdominal strength to at least  4/5 for improved spinal stabilization with functional activity.  Pt will increase strength in B hips with MMT to at least 4+/5 for improved stability in gait.  Pt will consistently walk down steps in a reciprocal pattern and </= 2 pint increase in R hip pain.  Pt will walk x 15-20 min with </= 2 point increase in R hip pain.  Pt will improve score on HOS to at least 82% function indicating improved ability to complete typical activity.     [facility]  Access Code: [email or web address]  URL: [email or web address]  Date: 07/12/2024  Prepared by: Carmenza Osorio    Exercises  - Supine Transversus Abdominis Bracing - Hands on Stomach  - 2 x daily - 1 sets - 20-30 reps - 5 hold  - Hip Flexor Stretch at Edge of Bed  - 1 x daily - 1 sets - 3 reps - 30 hold  - Supine Figure 4 Piriformis Stretch  - 1-2 x daily - 1 sets - 2 reps - 30 hold  - Supine Piriformis Stretch with Foot on Ground  - 1-2 x daily - 1 sets - 2 reps - 30 hold  - Supine Hamstring Stretch with Strap  - 1-2 x daily - 1 sets - 3 reps - 30  hold  - Prone Heel Squeeze  - 5 x weekly - 2 sets - 10 reps - 5 hold  - Supine Bridge with Resistance Band  - 5 x weekly - 1-2 sets - 10 reps - 10 hold  - Clamshell with Resistance  - 5-6 x weekly - 1-2 sets - 10 reps - 10 hold  - Quadruped Bent Leg Hip Extension  - 5 x weekly - 1-2 sets - 5 reps - 5 hold  - Standing Hip Internal Rotation AAROM on Stool  - 5 x weekly - 2 sets - 10 reps - 5 hold  - Standing Hip Flexor Stretch  - 5 x weekly - 2 sets - 10 reps - 5 hold   [Well Developed] : well developed [Well Nourished] : well nourished [No Acute Distress] : no acute distress [Normal Venous Pressure] : normal venous pressure [No Carotid Bruit] : no carotid bruit [Normal S1, S2] : normal S1, S2 [No Murmur] : no murmur [No Rub] : no rub [Clear Lung Fields] : clear lung fields [Good Air Entry] : good air entry [No Respiratory Distress] : no respiratory distress  [Soft] : abdomen soft [Non Tender] : non-tender [No Masses/organomegaly] : no masses/organomegaly [Normal Bowel Sounds] : normal bowel sounds [Normal Gait] : normal gait [No Edema] : no edema [No Cyanosis] : no cyanosis [No Varicosities] : no varicosities [No Clubbing] : no clubbing [No Rash] : no rash [No Skin Lesions] : no skin lesions [Moves all extremities] : moves all extremities [No Focal Deficits] : no focal deficits [Normal Speech] : normal speech [Alert and Oriented] : alert and oriented [Normal memory] : normal memory [Normal Appearance] : normal appearance [General Appearance - Well Developed] : well developed [Well Groomed] : well groomed [General Appearance - Well Nourished] : well nourished [No Deformities] : no deformities [General Appearance - In No Acute Distress] : no acute distress [Normal Conjunctiva] : the conjunctiva exhibited no abnormalities [Normal Oral Mucosa] : normal oral mucosa [Normal Jugular Venous A Waves Present] : normal jugular venous A waves present [Normal Jugular Venous V Waves Present] : normal jugular venous V waves present [No Jugular Venous Ko A Waves] : no jugular venous ko A waves [Respiration, Rhythm And Depth] : normal respiratory rhythm and effort [Exaggerated Use Of Accessory Muscles For Inspiration] : no accessory muscle use [Auscultation Breath Sounds / Voice Sounds] : lungs were clear to auscultation bilaterally [Bowel Sounds] : normal bowel sounds [Abdomen Soft] : soft [Abnormal Walk] : normal gait [Gait - Sufficient For Exercise Testing] : the gait was sufficient for exercise testing [Nail Clubbing] : no clubbing of the fingernails [Cyanosis, Localized] : no localized cyanosis [Skin Color & Pigmentation] : normal skin color and pigmentation [Skin Turgor] : normal skin turgor [] : no rash [Mood] : the mood was normal [5th Left ICS - MCL] : palpated at the 5th LICS in the midclavicular line [Normal] : normal [No Precordial Heave] : no precordial heave was noted [Normal Rate] : normal [Rhythm Regular] : regular [Normal S1] : normal S1 [Normal S2] : normal S2 [No Gallop] : no gallop heard [II] : a grade 2 [I] : a grade 1 [No Abnormalities] : the abdominal aorta was not enlarged and no bruit was heard [No Pitting Edema] : no pitting edema present [Oriented To Time, Place, And Person] : oriented to person, place, and time [Oriented to Person] : oriented to person [Oriented to Place] : oriented to place [Oriented to Time] : oriented to time [FreeTextEntry1] : Pt is forgetful and poor historian.  [S3] : no S3 [S4] : no S4 [Click] : no click [Right Carotid Bruit] : no bruit heard over the right carotid [Left Carotid Bruit] : no bruit heard over the left carotid [2+] : left 2+ [Right Femoral Bruit] : no bruit heard over the right femoral artery [Left Femoral Bruit] : no bruit heard over the left femoral artery

## 2024-07-26 ENCOUNTER — TREATMENT (OUTPATIENT)
Age: 54
End: 2024-07-26
Payer: COMMERCIAL

## 2024-07-26 DIAGNOSIS — M25.551 GREATER TROCHANTERIC PAIN SYNDROME OF RIGHT LOWER EXTREMITY: ICD-10-CM

## 2024-07-26 DIAGNOSIS — M25.651 HIP JOINT STIFFNESS, RIGHT: ICD-10-CM

## 2024-07-26 DIAGNOSIS — M16.11 PRIMARY OSTEOARTHRITIS OF RIGHT HIP: ICD-10-CM

## 2024-07-26 DIAGNOSIS — M25.551 RIGHT HIP PAIN: Primary | ICD-10-CM

## 2024-07-26 DIAGNOSIS — M62.81 MUSCLE WEAKNESS: ICD-10-CM

## 2024-07-26 PROCEDURE — 97110 THERAPEUTIC EXERCISES: CPT | Performed by: PHYSICAL THERAPIST

## 2024-07-26 PROCEDURE — 97140 MANUAL THERAPY 1/> REGIONS: CPT | Performed by: PHYSICAL THERAPIST

## 2024-07-26 NOTE — PROGRESS NOTES
she can walk a good distance, maybe even a half mile.     Medications: no changes since last session    OBJECTIVE     Hip Outcome Score:    6/17/24: 37/60= 62% function   7/12/24: 55/68 = 81% function   7/26/24: 61/68 = 90% function     * = increased R hip pain  AROM 6/14/2024  Right 7/12/24  Right 7/26/24  Right 6/14/2024  Left   Hip Flexion 125* 130 132 132   Hip ER 32* 35* 40 40   Hip IR 42* 45* 50* 40       Strength/MMT (0-5 Scale):    R 6/14 R 7/12 R 7/26 L 6/14 L 7/12 L 7/26   Hip Flexion 3+* 4+ 5 4 4+ 5   Hip Extension 4+ 5 5 4+ 4 4+   Hip Abduction 3+* 4+ 4+ 4 4+ 4+   Hip ER 3+* 4 4+ 3+ 4 4+   Hip IR 4* 5* 5 4 5 5   Lower Abdominals 3+ 4- 4         Gait: symmetrical gait pattern  Stairs: reciprocal up/down with rail for balance, good control    Treatment provided today:  Therapeutic exercise (88845) x 30 min to develop ROM, strength, endurance and flexibility BLEs with focus on the R.  Nustep level 6 x 5 minHooklying pelvic tilt x 20  Hooklying pelvic rotation w/ alternating knees forward x 1 min  Hooklying lower trunk rotation w/ feet wide x 1 min  Side step, medium booty band, 10 feet x 3  Standing hip extension, medium booty band, 2x10 B  Sidelying clamshell isometric, medium booty band, H20sec x 3 R  Hip circumduction with slight knee flexion standing leg x 1 min B  Slow quadruped rocking Hooklying bridge, isometric ER against medium booty band, H20sec x 3  Quadruped R long axis hip ER in small range w/ foot on wall x 10  Sidelying knee flexion/extension in abduction [id number] hip ER in abduction x 12Reviewed HEPManual therapy (52521) x 8 min utilizing techniques to improve joint and/or soft tissue mobility, ROM, and function as well as helping to decrease pain/spasms and swelling.  Palpation and assessment of soft tissue, muscles, and landmarks   Long axis traction RLE  Grade III-IV posterior and anterior R hip mobs  Belted lateral and inferior R hip mobs  [place] in prone     ASSESSMENT

## 2024-08-01 ENCOUNTER — OFFICE VISIT (OUTPATIENT)
Dept: ORTHOPEDIC SURGERY | Age: 54
End: 2024-08-01
Payer: COMMERCIAL

## 2024-08-01 DIAGNOSIS — M25.551 GREATER TROCHANTERIC PAIN SYNDROME OF RIGHT LOWER EXTREMITY: ICD-10-CM

## 2024-08-01 DIAGNOSIS — M16.11 PRIMARY OSTEOARTHRITIS OF RIGHT HIP: Primary | ICD-10-CM

## 2024-08-01 PROCEDURE — 99213 OFFICE O/P EST LOW 20 MIN: CPT | Performed by: STUDENT IN AN ORGANIZED HEALTH CARE EDUCATION/TRAINING PROGRAM

## 2024-08-20 ENCOUNTER — TELEPHONE (OUTPATIENT)
Dept: FAMILY MEDICINE CLINIC | Facility: CLINIC | Age: 54
End: 2024-08-20

## 2024-08-20 NOTE — TELEPHONE ENCOUNTER
The patient stated that she tested herself yesterday (8/19/24) at home for COVID and she was positive to the virus.      She is experiencing cough, headaches, sore throat, body aches, sinus drainage and having a fever.    She would like to get a prescription for PLAXOVID and a cough medicine ASAP.    Her pharmacy is at [facility] at [address] [place] [address].  The pharmacy phone number is [phone].

## 2024-08-21 DIAGNOSIS — U07.1 COVID: Primary | ICD-10-CM

## 2024-08-21 RX ORDER — BROMPHENIRAMINE MALEATE, PSEUDOEPHEDRINE HYDROCHLORIDE, AND DEXTROMETHORPHAN HYDROBROMIDE 2; 30; 10 MG/5ML; MG/5ML; MG/5ML
5 SYRUP ORAL 4 TIMES DAILY PRN
Qty: 118 ML | Refills: 0 | Status: SHIPPED | OUTPATIENT
Start: 2024-08-21

## 2024-08-21 NOTE — PROGRESS NOTES
I sent the requested medication/product in. Please let patient know to check with their pharmacy  Thanks  Paige BALTAZAR

## 2024-10-07 ENCOUNTER — PATIENT MESSAGE (OUTPATIENT)
Dept: FAMILY MEDICINE CLINIC | Facility: CLINIC | Age: 54
End: 2024-10-07

## 2024-10-07 DIAGNOSIS — Z78.0 MENOPAUSE: ICD-10-CM

## 2024-10-07 DIAGNOSIS — R53.83 OTHER FATIGUE: Primary | ICD-10-CM

## 2024-10-07 DIAGNOSIS — T14.8XXA BRUISING: ICD-10-CM

## 2024-10-08 RX ORDER — ESTRADIOL 2 MG/1
2 TABLET ORAL DAILY
Qty: 90 TABLET | Refills: 0 | Status: SHIPPED | OUTPATIENT
Start: 2024-10-08

## 2024-10-09 DIAGNOSIS — E55.9 VITAMIN D DEFICIENCY, UNSPECIFIED: ICD-10-CM

## 2024-10-09 DIAGNOSIS — E78.5 BORDERLINE HYPERLIPIDEMIA: ICD-10-CM

## 2024-10-09 DIAGNOSIS — R53.83 OTHER FATIGUE: ICD-10-CM

## 2024-10-09 DIAGNOSIS — T14.8XXA BRUISING: ICD-10-CM

## 2024-10-09 DIAGNOSIS — E55.9 VITAMIN D DEFICIENCY, UNSPECIFIED: Primary | ICD-10-CM

## 2024-10-09 LAB
25(OH)D3 SERPL-MCNC: 44.9 NG/ML (ref 30–100)
ALBUMIN SERPL-MCNC: 3.3 G/DL (ref 3.5–5)
ALBUMIN/GLOB SERPL: 0.9 (ref 1–1.9)
ALP SERPL-CCNC: 90 U/L (ref 35–104)
ALT SERPL-CCNC: 10 U/L (ref 8–45)
ANION GAP SERPL CALC-SCNC: 11 MMOL/L (ref 9–18)
AST SERPL-CCNC: 19 U/L (ref 15–37)
BASOPHILS # BLD: 0.1 K/UL (ref 0–0.2)
BASOPHILS NFR BLD: 1 % (ref 0–2)
BILIRUB SERPL-MCNC: 0.2 MG/DL (ref 0–1.2)
BUN SERPL-MCNC: 9 MG/DL (ref 6–23)
CALCIUM SERPL-MCNC: 8.9 MG/DL (ref 8.8–10.2)
CHLORIDE SERPL-SCNC: 100 MMOL/L (ref 98–107)
CHOLEST SERPL-MCNC: 200 MG/DL (ref 0–200)
CO2 SERPL-SCNC: 27 MMOL/L (ref 20–28)
CREAT SERPL-MCNC: 0.72 MG/DL (ref 0.6–1.1)
DIFFERENTIAL METHOD BLD: ABNORMAL
EOSINOPHIL # BLD: 0.3 K/UL (ref 0–0.8)
EOSINOPHIL NFR BLD: 4 % (ref 0.5–7.8)
ERYTHROCYTE [DISTWIDTH] IN BLOOD BY AUTOMATED COUNT: 12.9 % (ref 11.9–14.6)
GLOBULIN SER CALC-MCNC: 3.6 G/DL (ref 2.3–3.5)
GLUCOSE SERPL-MCNC: 113 MG/DL (ref 70–99)
HCT VFR BLD AUTO: 35.4 % (ref 35.8–46.3)
HDLC SERPL-MCNC: 71 MG/DL (ref 40–60)
HDLC SERPL: 2.8 (ref 0–5)
HGB BLD-MCNC: 11.9 G/DL (ref 11.7–15.4)
IMM GRANULOCYTES # BLD AUTO: 0 K/UL (ref 0–0.5)
IMM GRANULOCYTES NFR BLD AUTO: 0 % (ref 0–5)
LDLC SERPL CALC-MCNC: 86 MG/DL (ref 0–100)
LYMPHOCYTES # BLD: 2.6 K/UL (ref 0.5–4.6)
LYMPHOCYTES NFR BLD: 37 % (ref 13–44)
MCH RBC QN AUTO: 29.2 PG (ref 26.1–32.9)
MCHC RBC AUTO-ENTMCNC: 33.6 G/DL (ref 31.4–35)
MCV RBC AUTO: 87 FL (ref 82–102)
MONOCYTES # BLD: 0.4 K/UL (ref 0.1–1.3)
MONOCYTES NFR BLD: 6 % (ref 4–12)
NEUTS SEG # BLD: 3.7 K/UL (ref 1.7–8.2)
NEUTS SEG NFR BLD: 52 % (ref 43–78)
NRBC # BLD: 0 K/UL (ref 0–0.2)
PLATELET # BLD AUTO: 382 K/UL (ref 150–450)
PMV BLD AUTO: 9.7 FL (ref 9.4–12.3)
POTASSIUM SERPL-SCNC: 3.2 MMOL/L (ref 3.5–5.1)
PROT SERPL-MCNC: 6.9 G/DL (ref 6.3–8.2)
RBC # BLD AUTO: 4.07 M/UL (ref 4.05–5.2)
SODIUM SERPL-SCNC: 138 MMOL/L (ref 136–145)
TRIGL SERPL-MCNC: 217 MG/DL (ref 0–150)
TSH, 3RD GENERATION: 1.17 UIU/ML (ref 0.27–4.2)
VLDLC SERPL CALC-MCNC: 43 MG/DL (ref 6–23)
WBC # BLD AUTO: 7.2 K/UL (ref 4.3–11.1)

## 2024-10-21 ENCOUNTER — TELEMEDICINE (OUTPATIENT)
Dept: FAMILY MEDICINE CLINIC | Facility: CLINIC | Age: 54
End: 2024-10-21
Payer: COMMERCIAL

## 2024-10-21 DIAGNOSIS — E87.6 HYPOKALEMIA: Primary | ICD-10-CM

## 2024-10-21 DIAGNOSIS — Z12.31 ENCOUNTER FOR SCREENING MAMMOGRAM FOR MALIGNANT NEOPLASM OF BREAST: ICD-10-CM

## 2024-10-21 DIAGNOSIS — K63.5 HYPERPLASTIC POLYP OF DESCENDING COLON: ICD-10-CM

## 2024-10-21 PROCEDURE — 99214 OFFICE O/P EST MOD 30 MIN: CPT | Performed by: PHYSICIAN ASSISTANT

## 2024-10-21 RX ORDER — ZALEPLON 10 MG/1
10 CAPSULE ORAL NIGHTLY
COMMUNITY
Start: 2024-03-13

## 2024-10-21 RX ORDER — POTASSIUM CHLORIDE 750 MG/1
10 TABLET, EXTENDED RELEASE ORAL 2 TIMES DAILY
Qty: 180 TABLET | Refills: 1 | Status: SHIPPED | OUTPATIENT
Start: 2024-10-21

## 2024-10-21 NOTE — PROGRESS NOTES
[facility]  [address] [facility] [address]  Phone: [phone]  Fax [phone]  Provider : Paige Acuña PA-C      Patient: Kervin Burleson  YOB: 1970  Patient Age 54 y.o.  Patient sex: female  Medical Record:  [id number]  Visit Date:10/21/2024   Author:  Paige Acuña PA-C    Family Practice Clinic Note   [facility]  [address] [facility] [address]  Phone: [phone]  Fax [phone]  Provider : Paige Acuña PA-C    [facility] Virtual  Visit Note Video Conference Note  Location:  To Patients electronic /phone device in their home  From Medical provider    Kervin Burleson is a 54 y.o. y.o. female  being evaluated by a Virtual Visit (video visit) encounter to address concerns.    Consent:  The patient and/or health care decision maker is aware that that they may receive a bill for this audio-video service, depending on his insurance coverage, and has provided verbal consent to proceed: Yes    Chief Complaint  Kervin Burleson  is a 54 y.o. female who was seen by synchronous (real-time) audio-video technology on 10/28/24  3:26 AM  for the following reasons:  No chief complaint on file.  Patient, was evaluated through a synchronous (real-time) audio-video encounter. The patient (or guardian if applicable) is aware that this is a billable service, which includes applicable co-pays. This Virtual Visit was conducted with patient's (and/or legal guardian's) consent. Patient identification was verified, and a caregiver was present when appropriate.   The patient was located at Home: [address] [facility] [address]  Provider was located at  [address] [facility] [address]  Return in about 1 month (around 11/21/2024).   Patient advised to call the office if symptoms worsen.    Paige Acuña PA-C  3:26 AM  10/28/2024    Chief complaint  Kervin Burleson  is a 54 y.o. female who was seen on 10/28/24  3:26 AM  for the following reasons:    No chief complaint on

## 2024-10-28 ASSESSMENT — ENCOUNTER SYMPTOMS
COUGH: 0
SHORTNESS OF BREATH: 0

## 2024-10-28 ASSESSMENT — PATIENT HEALTH QUESTIONNAIRE - PHQ9
2. FEELING DOWN, DEPRESSED OR HOPELESS: NOT AT ALL
SUM OF ALL RESPONSES TO PHQ QUESTIONS 1-9: 0
SUM OF ALL RESPONSES TO PHQ QUESTIONS 1-9: 0
1. LITTLE INTEREST OR PLEASURE IN DOING THINGS: NOT AT ALL
SUM OF ALL RESPONSES TO PHQ9 QUESTIONS 1 & 2: 0
SUM OF ALL RESPONSES TO PHQ QUESTIONS 1-9: 0
SUM OF ALL RESPONSES TO PHQ QUESTIONS 1-9: 0

## 2024-10-31 ENCOUNTER — PREP FOR PROCEDURE (OUTPATIENT)
Dept: GASTROENTEROLOGY | Age: 54
End: 2024-10-31

## 2024-10-31 DIAGNOSIS — Z12.11 ENCOUNTER FOR SCREENING COLONOSCOPY: ICD-10-CM

## 2024-10-31 DIAGNOSIS — E87.6 HYPOKALEMIA: ICD-10-CM

## 2024-10-31 LAB
ANION GAP SERPL CALC-SCNC: 10 MMOL/L (ref 7–16)
BUN SERPL-MCNC: 14 MG/DL (ref 6–23)
CALCIUM SERPL-MCNC: 8.9 MG/DL (ref 8.8–10.2)
CHLORIDE SERPL-SCNC: 100 MMOL/L (ref 98–107)
CO2 SERPL-SCNC: 26 MMOL/L (ref 20–29)
CREAT SERPL-MCNC: 0.79 MG/DL (ref 0.6–1.1)
GLUCOSE SERPL-MCNC: 109 MG/DL (ref 70–99)
POTASSIUM SERPL-SCNC: 3.6 MMOL/L (ref 3.5–5.1)
SODIUM SERPL-SCNC: 136 MMOL/L (ref 136–145)

## 2024-11-01 RX ORDER — SODIUM CHLORIDE 0.9 % (FLUSH) 0.9 %
5-40 SYRINGE (ML) INJECTION EVERY 12 HOURS SCHEDULED
Status: CANCELLED | OUTPATIENT
Start: 2024-11-01

## 2024-11-01 RX ORDER — SODIUM CHLORIDE 0.9 % (FLUSH) 0.9 %
5-40 SYRINGE (ML) INJECTION PRN
Status: CANCELLED | OUTPATIENT
Start: 2024-11-01

## 2024-11-01 RX ORDER — SODIUM CHLORIDE 9 MG/ML
25 INJECTION, SOLUTION INTRAVENOUS PRN
Status: CANCELLED | OUTPATIENT
Start: 2024-11-01

## 2024-11-12 NOTE — PROGRESS NOTES
Patient verified name, , and procedure.    Type: 1A; abbreviated assessment per anesthesia guidelines.    Labs per anesthesia: POCT Potassium- held for DOS.  EKG: Not needed, per anesthesia guidelines.     Instructed pt that they will be notified the day before their procedure by the GI Lab for time of arrival if their procedure is [place] and Pre-op for [facility] cases. Arrival times should be called by 5 pm. If no phone is received the patient should contact their respective hospital. The GI lab telephone number is [phone] and ES Pre-op is [phone].     Follow diet and prep instructions per office, including NPO status.      Pt requested to drink 32 ounces of non-caffeinated clear liquids 2 hours prior to their arrival to avoid dehydration, per anesthesia recommendation.     Bath or shower the night before and the am of surgery with non-moisturizing soap. No lotions, oils, powders, perfumes, or cologne on skin. No make up, eye make up or jewelry. Wear loose fitting comfortable, clean clothing.     Must have adult present in building the entire time .     Medications to take on the day of procedure: Fluoxetine, Omeprazole, per anesthesia guidelines.    Medications to hold: None, per anesthesia guidelines.     Patient instructed to hold all vitamins/supplements 7 days prior to surgery and NSAIDS 5 days prior to surgery. Verbalized understanding.     The following discharge instructions reviewed with patient: medication given during procedure may cause drowsiness for several hours, therefore, do not drive or operate machinery for remainder of the day. You may not drink alcohol on the day of your procedure, please resume regular diet and activity unless otherwise directed. You may experience abdominal distention for several hours that is relieved by the passage of gas. Contact your physician if you have any of the following: fever or chills, severe abdominal pain or excessive amount of bleeding or a large amount

## 2024-11-15 NOTE — PROGRESS NOTES
RN called Pt to confirm appointment time of 1030, arrival time 0900, location,  requirement, and instructions for registration at the hospital.  Pt verbalized understanding.     Informed pt to drink 32oz of water 2 hours prior to arrival

## 2024-11-18 ENCOUNTER — ANESTHESIA (OUTPATIENT)
Dept: ENDOSCOPY | Age: 54
End: 2024-11-18
Payer: COMMERCIAL

## 2024-11-18 ENCOUNTER — ANESTHESIA EVENT (OUTPATIENT)
Dept: ENDOSCOPY | Age: 54
End: 2024-11-18
Payer: COMMERCIAL

## 2024-11-18 ENCOUNTER — HOSPITAL ENCOUNTER (OUTPATIENT)
Age: 54
Discharge: HOME | End: 2024-11-18
Attending: STUDENT IN AN ORGANIZED HEALTH CARE EDUCATION/TRAINING PROGRAM | Admitting: STUDENT IN AN ORGANIZED HEALTH CARE EDUCATION/TRAINING PROGRAM
Payer: COMMERCIAL

## 2024-11-18 VITALS
SYSTOLIC BLOOD PRESSURE: 118 MMHG | RESPIRATION RATE: 19 BRPM | WEIGHT: 167 LBS | OXYGEN SATURATION: 98 % | HEART RATE: 70 BPM | TEMPERATURE: 96.8 F | HEIGHT: 66 IN | DIASTOLIC BLOOD PRESSURE: 67 MMHG | BODY MASS INDEX: 26.84 KG/M2

## 2024-11-18 DIAGNOSIS — Z12.11 ENCOUNTER FOR SCREENING COLONOSCOPY: ICD-10-CM

## 2024-11-18 PROCEDURE — 2500000003 HC RX 250 WO HCPCS: Performed by: NURSE ANESTHETIST, CERTIFIED REGISTERED

## 2024-11-18 PROCEDURE — 7100000011 HC PHASE II RECOVERY - ADDTL 15 MIN: Performed by: STUDENT IN AN ORGANIZED HEALTH CARE EDUCATION/TRAINING PROGRAM

## 2024-11-18 PROCEDURE — 2580000003 HC RX 258: Performed by: NURSE ANESTHETIST, CERTIFIED REGISTERED

## 2024-11-18 PROCEDURE — 3609010600 HC COLONOSCOPY POLYPECTOMY SNARE/COLD BIOPSY: Performed by: STUDENT IN AN ORGANIZED HEALTH CARE EDUCATION/TRAINING PROGRAM

## 2024-11-18 PROCEDURE — 2709999900 HC NON-CHARGEABLE SUPPLY: Performed by: STUDENT IN AN ORGANIZED HEALTH CARE EDUCATION/TRAINING PROGRAM

## 2024-11-18 PROCEDURE — 3700000001 HC ADD 15 MINUTES (ANESTHESIA): Performed by: STUDENT IN AN ORGANIZED HEALTH CARE EDUCATION/TRAINING PROGRAM

## 2024-11-18 PROCEDURE — 6360000002 HC RX W HCPCS: Performed by: NURSE ANESTHETIST, CERTIFIED REGISTERED

## 2024-11-18 PROCEDURE — 7100000010 HC PHASE II RECOVERY - FIRST 15 MIN: Performed by: STUDENT IN AN ORGANIZED HEALTH CARE EDUCATION/TRAINING PROGRAM

## 2024-11-18 PROCEDURE — 88305 TISSUE EXAM BY PATHOLOGIST: CPT

## 2024-11-18 PROCEDURE — 3700000000 HC ANESTHESIA ATTENDED CARE: Performed by: STUDENT IN AN ORGANIZED HEALTH CARE EDUCATION/TRAINING PROGRAM

## 2024-11-18 RX ORDER — PROPOFOL 10 MG/ML
INJECTION, EMULSION INTRAVENOUS
Status: DISCONTINUED | OUTPATIENT
Start: 2024-11-18 | End: 2024-11-18 | Stop reason: SDUPTHER

## 2024-11-18 RX ORDER — SODIUM CHLORIDE 0.9 % (FLUSH) 0.9 %
SYRINGE (ML) INJECTION
Status: DISCONTINUED | OUTPATIENT
Start: 2024-11-18 | End: 2024-11-18 | Stop reason: SDUPTHER

## 2024-11-18 RX ORDER — LIDOCAINE HYDROCHLORIDE 20 MG/ML
INJECTION, SOLUTION EPIDURAL; INFILTRATION; INTRACAUDAL; PERINEURAL
Status: DISCONTINUED | OUTPATIENT
Start: 2024-11-18 | End: 2024-11-18 | Stop reason: SDUPTHER

## 2024-11-18 RX ADMIN — SODIUM CHLORIDE, PRESERVATIVE FREE 20 ML: 5 INJECTION INTRAVENOUS at 10:26

## 2024-11-18 RX ADMIN — LIDOCAINE HYDROCHLORIDE 60 MG: 20 INJECTION, SOLUTION EPIDURAL; INFILTRATION; INTRACAUDAL; PERINEURAL at 10:20

## 2024-11-18 RX ADMIN — PROPOFOL 50 MG: 10 INJECTION, EMULSION INTRAVENOUS at 10:20

## 2024-11-18 RX ADMIN — PROPOFOL 180 MCG/KG/MIN: 10 INJECTION, EMULSION INTRAVENOUS at 10:21

## 2024-11-18 RX ADMIN — PROPOFOL 50 MG: 10 INJECTION, EMULSION INTRAVENOUS at 10:22

## 2024-11-18 RX ADMIN — SODIUM CHLORIDE, PRESERVATIVE FREE 20 ML: 5 INJECTION INTRAVENOUS at 10:22

## 2024-11-18 RX ADMIN — SODIUM CHLORIDE, PRESERVATIVE FREE 20 ML: 5 INJECTION INTRAVENOUS at 10:20

## 2024-11-18 RX ADMIN — PROPOFOL 50 MG: 10 INJECTION, EMULSION INTRAVENOUS at 10:26

## 2024-11-18 ASSESSMENT — PAIN SCALES - GENERAL
PAINLEVEL_OUTOF10: 0

## 2024-11-18 ASSESSMENT — PAIN - FUNCTIONAL ASSESSMENT: PAIN_FUNCTIONAL_ASSESSMENT: NONE - DENIES PAIN

## 2024-11-18 NOTE — ANESTHESIA PRE PROCEDURE
sprays by Nasal route 2 times daily  Patient taking differently: 2 sprays by Nasal route at bedtime 9/27/22  Yes Paige Acuña PA-C   fluticasone (FLONASE) 50 MCG/ACT nasal spray 2 sprays by Nasal route daily  Patient taking differently: 2 sprays by Nasal route at bedtime 9/27/22  Yes Paige Acuña PA-C   vitamin D (CHOLECALCIFEROL) 70876 UNIT CAPS Take 1 capsule by mouth every 7 days  Patient taking differently: Take 1 capsule by mouth every 7 days Takes on Fridays 9/27/22  Yes Paige Acuña PA-C   ibuprofen (ADVIL;MOTRIN) 200 MG tablet Take 4 tablets by mouth as needed for Pain   Yes Automatic Reconciliation, Ar       Current medications:    No current facility-administered medications for this encounter.       Allergies:    Allergies   Allergen Reactions    Metronidazole Rash       Problem List:    Patient Active Problem List   Diagnosis Code    Breast mass seen on mammogram N63.0    Non-seasonal allergic rhinitis J30.89    Primary insomnia F51.01    HTN (hypertension) I10    Vitamin D deficiency E55.9    Cervical radiculopathy M54.12    Edema R60.9    Thyroid nodule E04.1    Allergic rhinitis due to pollen J30.1    Gastroesophageal reflux disease without esophagitis K21.9    Palpitations R00.2    Shortness of breath R06.02    EDITH (obstructive sleep apnea) G47.33    Encounter for screening colonoscopy Z12.11       Past Medical History:        Diagnosis Date    Allergic rhinitis due to pollen     Cervical radiculopathy     GERD (gastroesophageal reflux disease)     History of blood transfusion 1998    after hysterectomy    Hypertension     Insomnia     Major depression     Menopause     Obstructive sleep apnea on CPAP     pt does not wear it    Onychomycosis     Thyroid nodule        Past Surgical History:        Procedure Laterality Date    BREAST BIOPSY Bilateral     BREAST LUMPECTOMY Left     benign    CARDIAC CATHETERIZATION      Benign per pt    COLONOSCOPY      11/19    HEENT      sinus surgery x 3

## 2024-11-18 NOTE — DISCHARGE INSTRUCTIONS
Gastrointestinal Colonoscopy/Flexible Sigmoidoscopy - Lower Exam Discharge Instructions  Call Dr. Berumen at [phone] for any problems or questions.  Contact the doctor’s office for follow up appointment as directed  Medication may cause drowsiness for several hours, therefore, do not drive or operate machinery for remainder of the day.  No alcohol today.  Ordinarily, you may resume regular diet and activity after exam unless otherwise specified by your physician.  Because of air put into your colon during exam, you may experience some abdominal distension and nausea, relieved by the passage of gas, for several hours.  Contact your physician if you have any of the following:  Excessive amount of bleeding - large amount when having a bowel movement.  Occasional specks of blood with bowel movement would not be unusual.  Severe abdominal pain  Fever or Chills    Any additional instructions:    Recommendation: Resume previous diet. Continue present medications. Patient has a contact number available for emergencies. The signs and symptoms of potential delayed complications were discussed with the patient. Return to normal activities tomorrow. Written discharge instructions were provided to the patient. Await for Pathology letter in 1-2 weeks through [employer], if not signed up for [employer], we will mail the results to your address. Repeat colonoscopy interval will be decided based on the pathology results, likely 5 years.

## 2024-11-18 NOTE — ANESTHESIA POSTPROCEDURE EVALUATION
Department of Anesthesiology  Postprocedure Note    Patient: Kervin Burleson  MRN: [id number]  YOB: 1970  Date of evaluation: 11/18/2024    Procedure Summary       Date: 11/18/24 Room / Location: [facility] 04 / [facility] ENDOSCOPY    Anesthesia Start: 1015 Anesthesia Stop: 1036    Procedure: COLORECTAL CANCER SCREENING, NOT HIGH RISK Diagnosis:       Encounter for screening colonoscopy      (Encounter for screening colonoscopy [Z12.11])    Surgeons: Peter Berumen MD Responsible Provider: Miguel Espana MD    Anesthesia Type: TIVA ASA Status: 2            Anesthesia Type: No value filed.    Lowell Phase I: Lowell Score: 10    Lowell Phase II: Lowell Score: 10    Anesthesia Post Evaluation    Patient location during evaluation: PACU  Patient participation: complete - patient participated  Level of consciousness: awake and alert  Airway patency: patent  Nausea & Vomiting: no nausea and no vomiting  Cardiovascular status: hemodynamically stable  Respiratory status: acceptable, nonlabored ventilation and spontaneous ventilation  Hydration status: euvolemic  Comments: /67   Pulse 70   Temp 96.8 °F (36 °C) (Skin)   Resp 19   Ht 1.676 m (5' 6\")   Wt 75.8 kg (167 lb)   SpO2 98%   BMI 26.95 kg/m²     Multimodal analgesia pain management approach  Pain management: adequate and satisfactory to patient        No notable events documented.

## 2024-11-18 NOTE — H&P
[employer]ixis  Psychiatric: Appropriate mood and affect.  Musculoskeletal: Strength and tone are symmetrical and maintained.    Peter Berumen MD  [facility]

## 2024-11-30 PROBLEM — Z12.11 ENCOUNTER FOR SCREENING COLONOSCOPY: Status: RESOLVED | Noted: 2024-10-31 | Resolved: 2024-11-30

## 2024-12-10 ENCOUNTER — HOSPITAL ENCOUNTER (OUTPATIENT)
Dept: MAMMOGRAPHY | Age: 54
Discharge: HOME | End: 2024-12-13
Payer: COMMERCIAL

## 2024-12-10 DIAGNOSIS — Z12.31 ENCOUNTER FOR SCREENING MAMMOGRAM FOR MALIGNANT NEOPLASM OF BREAST: ICD-10-CM

## 2024-12-10 PROCEDURE — 77063 BREAST TOMOSYNTHESIS BI: CPT

## 2024-12-13 ENCOUNTER — OFFICE VISIT (OUTPATIENT)
Dept: FAMILY MEDICINE CLINIC | Facility: CLINIC | Age: 54
End: 2024-12-13
Payer: COMMERCIAL

## 2024-12-13 VITALS
SYSTOLIC BLOOD PRESSURE: 120 MMHG | BODY MASS INDEX: 28.22 KG/M2 | HEIGHT: 66 IN | HEART RATE: 77 BPM | WEIGHT: 175.6 LBS | OXYGEN SATURATION: 98 % | DIASTOLIC BLOOD PRESSURE: 80 MMHG | TEMPERATURE: 97.4 F

## 2024-12-13 DIAGNOSIS — I10 PRIMARY HYPERTENSION: ICD-10-CM

## 2024-12-13 DIAGNOSIS — G47.33 OSA (OBSTRUCTIVE SLEEP APNEA): Chronic | ICD-10-CM

## 2024-12-13 DIAGNOSIS — F51.01 PRIMARY INSOMNIA: ICD-10-CM

## 2024-12-13 DIAGNOSIS — R31.9 HEMATURIA, UNSPECIFIED TYPE: ICD-10-CM

## 2024-12-13 DIAGNOSIS — R53.83 OTHER FATIGUE: ICD-10-CM

## 2024-12-13 DIAGNOSIS — Z23 VACCINE FOR VIRAL HEPATITIS: Primary | ICD-10-CM

## 2024-12-13 DIAGNOSIS — R73.09 ELEVATED GLUCOSE: ICD-10-CM

## 2024-12-13 DIAGNOSIS — R35.1 EXCESSIVE URINATION AT NIGHT: ICD-10-CM

## 2024-12-13 LAB
BILIRUBIN, URINE, POC: NORMAL
BLOOD URINE, POC: NORMAL
GLUCOSE URINE, POC: NORMAL
HBA1C MFR BLD: 5.2 %
KETONES, URINE, POC: NORMAL
LEUKOCYTE ESTERASE, URINE, POC: NORMAL
NITRITE, URINE, POC: NEGATIVE
PH, URINE, POC: 7.5 (ref 4.6–8)
PROTEIN,URINE, POC: NORMAL
SPECIFIC GRAVITY, URINE, POC: 1.02 (ref 1–1.03)
URINALYSIS CLARITY, POC: CLEAR
URINALYSIS COLOR, POC: YELLOW
UROBILINOGEN, POC: NORMAL

## 2024-12-13 PROCEDURE — 81003 URINALYSIS AUTO W/O SCOPE: CPT | Performed by: PHYSICIAN ASSISTANT

## 2024-12-13 PROCEDURE — 99214 OFFICE O/P EST MOD 30 MIN: CPT | Performed by: PHYSICIAN ASSISTANT

## 2024-12-13 PROCEDURE — 3079F DIAST BP 80-89 MM HG: CPT | Performed by: PHYSICIAN ASSISTANT

## 2024-12-13 PROCEDURE — 83036 HEMOGLOBIN GLYCOSYLATED A1C: CPT | Performed by: PHYSICIAN ASSISTANT

## 2024-12-13 PROCEDURE — 3074F SYST BP LT 130 MM HG: CPT | Performed by: PHYSICIAN ASSISTANT

## 2024-12-13 RX ORDER — HYDROCHLOROTHIAZIDE 25 MG/1
25 TABLET ORAL DAILY
Qty: 90 TABLET | Refills: 3 | Status: SHIPPED | OUTPATIENT
Start: 2024-12-13

## 2024-12-13 SDOH — ECONOMIC STABILITY: FOOD INSECURITY: WITHIN THE PAST 12 MONTHS, YOU WORRIED THAT YOUR FOOD WOULD RUN OUT BEFORE YOU GOT MONEY TO BUY MORE.: NEVER TRUE

## 2024-12-13 SDOH — ECONOMIC STABILITY: FOOD INSECURITY: WITHIN THE PAST 12 MONTHS, THE FOOD YOU BOUGHT JUST DIDN'T LAST AND YOU DIDN'T HAVE MONEY TO GET MORE.: NEVER TRUE

## 2024-12-13 SDOH — ECONOMIC STABILITY: INCOME INSECURITY: HOW HARD IS IT FOR YOU TO PAY FOR THE VERY BASICS LIKE FOOD, HOUSING, MEDICAL CARE, AND HEATING?: NOT HARD AT ALL

## 2024-12-13 ASSESSMENT — PATIENT HEALTH QUESTIONNAIRE - PHQ9
2. FEELING DOWN, DEPRESSED OR HOPELESS: NOT AT ALL
SUM OF ALL RESPONSES TO PHQ QUESTIONS 1-9: 0
SUM OF ALL RESPONSES TO PHQ QUESTIONS 1-9: 0
SUM OF ALL RESPONSES TO PHQ9 QUESTIONS 1 & 2: 0
SUM OF ALL RESPONSES TO PHQ QUESTIONS 1-9: 0
1. LITTLE INTEREST OR PLEASURE IN DOING THINGS: NOT AT ALL
SUM OF ALL RESPONSES TO PHQ QUESTIONS 1-9: 0

## 2024-12-13 NOTE — PROGRESS NOTES
[facility]  [address] [facility] [address]  Phone: [phone]  Fax [phone]  Provider : Paige Acuña PA-C      Patient: Kervin Burleson  YOB: 1970  Patient Age 54 y.o.  Patient sex: female  Medical Record:  [id number]  Visit Date:12/13/2024   Author:  Paige Acuña PA-C    Family Practice Clinic Note     Chief complaint  Kervin Burleson  is a 54 y.o. female who was seen on 12/17/24  12:43 PM  for the following reasons:    Chief Complaint   Patient presents with    Follow-up     fatigue    Discuss Labs       Current Medical problems addressed  She is traveling to [facility] and needs hep a vaccine so sent order to pharmacy  She has insomnia and uses sonata and prozac and trazadone to help her sleep and avoid intrusive thoughts and gets about 8 hours of sleep but its often disrupted.  She notes her  says she does snore.  She had a sleep study and given cpap but never wore it.  She has allergic rhinitis and gets itchy and constantly knocked her mask off     ASSESSMENT AND PLAN    ICD-10-CM    1. Vaccine for viral hepatitis  Z23 hepatitis A vaccine (HAVRIX, PF,) 1440 EL U/ML injection      2. Other fatigue  R53.83       3. EDITH (obstructive sleep apnea)  G47.33 [facility]      4. Elevated glucose  R73.09 AMB POC HEMOGLOBIN A1C      5. Excessive urination at night  R35.1 AMB POC HEMOGLOBIN A1C     AMB POC URINALYSIS DIP STICK AUTO W/O MICRO      6. Primary hypertension  I10 hydroCHLOROthiazide (HYDRODIURIL) 25 MG tablet      7. Hematuria, unspecified type  R31.9 Culture, Urine         Kervin was seen today for follow-up and discuss labs.    Diagnoses and all orders for this visit:    Vaccine for viral hepatitis  -     hepatitis A vaccine (HAVRIX, PF,) 1440 EL U/ML injection; Inject 1,440 Units into the muscle once for 1 dose    Other fatigue    EDITH (obstructive sleep apnea)  -     [facility]    Elevated glucose  -

## 2024-12-15 LAB
BACTERIA SPEC CULT: NORMAL
SERVICE CMNT-IMP: NORMAL

## 2024-12-17 ASSESSMENT — ENCOUNTER SYMPTOMS
SHORTNESS OF BREATH: 0
COUGH: 0

## 2024-12-26 DIAGNOSIS — E55.9 VITAMIN D DEFICIENCY, UNSPECIFIED: ICD-10-CM

## 2024-12-26 DIAGNOSIS — I10 PRIMARY HYPERTENSION: ICD-10-CM

## 2024-12-26 DIAGNOSIS — Z78.0 MENOPAUSE: ICD-10-CM

## 2024-12-26 DIAGNOSIS — K21.9 GASTROESOPHAGEAL REFLUX DISEASE WITHOUT ESOPHAGITIS: ICD-10-CM

## 2024-12-26 RX ORDER — LOSARTAN POTASSIUM 100 MG/1
100 TABLET ORAL DAILY
Qty: 90 TABLET | Refills: 3 | Status: SHIPPED | OUTPATIENT
Start: 2024-12-26

## 2024-12-26 RX ORDER — OMEPRAZOLE 40 MG/1
40 CAPSULE, DELAYED RELEASE ORAL DAILY
Qty: 90 CAPSULE | Refills: 3 | Status: SHIPPED | OUTPATIENT
Start: 2024-12-26

## 2024-12-26 RX ORDER — ESTRADIOL 2 MG/1
2 TABLET ORAL NIGHTLY
Qty: 90 TABLET | Refills: 0 | Status: SHIPPED | OUTPATIENT
Start: 2024-12-26

## 2025-01-15 DIAGNOSIS — G47.33 OSA (OBSTRUCTIVE SLEEP APNEA): Primary | Chronic | ICD-10-CM

## 2025-01-17 ENCOUNTER — OFFICE VISIT (OUTPATIENT)
Dept: FAMILY MEDICINE CLINIC | Facility: CLINIC | Age: 55
End: 2025-01-17
Payer: COMMERCIAL

## 2025-01-17 VITALS
BODY MASS INDEX: 28.45 KG/M2 | OXYGEN SATURATION: 96 % | WEIGHT: 177 LBS | TEMPERATURE: 97.9 F | DIASTOLIC BLOOD PRESSURE: 76 MMHG | HEIGHT: 66 IN | SYSTOLIC BLOOD PRESSURE: 110 MMHG | HEART RATE: 72 BPM

## 2025-01-17 DIAGNOSIS — Z78.0 MENOPAUSE: ICD-10-CM

## 2025-01-17 DIAGNOSIS — N63.11 MASS OF UPPER OUTER QUADRANT OF RIGHT BREAST: Primary | ICD-10-CM

## 2025-01-17 PROCEDURE — 3078F DIAST BP <80 MM HG: CPT | Performed by: PHYSICIAN ASSISTANT

## 2025-01-17 PROCEDURE — 3074F SYST BP LT 130 MM HG: CPT | Performed by: PHYSICIAN ASSISTANT

## 2025-01-17 PROCEDURE — 99213 OFFICE O/P EST LOW 20 MIN: CPT | Performed by: PHYSICIAN ASSISTANT

## 2025-01-17 RX ORDER — ESTRADIOL 2 MG/1
2 TABLET ORAL NIGHTLY
Qty: 90 TABLET | Refills: 3 | Status: SHIPPED | OUTPATIENT
Start: 2025-01-17

## 2025-01-17 NOTE — PROGRESS NOTES
Maternal Grandfather     Diabetes Maternal Grandfather     Osteoarthritis Maternal Grandfather     Heart Disease Paternal Grandmother     Hypertension Paternal Grandmother     Stroke Paternal Grandmother     Breast Cancer Paternal Grandmother     Bilateral breast cancer Paternal Grandmother     Hypertension Paternal Grandfather     Stroke Paternal Grandfather     Heart Disease Paternal Grandfather     Thyroid Cancer Neg Hx        Surgical History:  Past Surgical History:   Procedure Laterality Date    BREAST BIOPSY Bilateral     BREAST LUMPECTOMY Left     benign    CARDIAC CATHETERIZATION      Benign per pt    COLONOSCOPY      11/19    COLONOSCOPY N/A 11/18/2024    COLONOSCOPY POLYPECTOMY SNARE/BIOPSY performed by Peter Berumen MD at [facility] ENDOSCOPY    HEENT      sinus surgery x 3    HYSTERECTOMY, TOTAL ABDOMINAL (CERVIX REMOVED)  1998    LYMPHADENECTOMY      left axilla lymph nodes    SALPINGO-OOPHORECTOMY  1990s    SALPINGO-OOPHORECTOMY  1998       ROS  Review of Systems   Constitutional:  Negative for fever.   Eyes:  Negative for visual disturbance.   Respiratory:  Negative for cough and shortness of breath.    Cardiovascular:  Negative for chest pain.   Endocrine:        Breast mass in right  breast   Neurological:  Negative for syncope.       /76   Pulse 72   Temp 97.9 °F (36.6 °C)   Ht 1.676 m (5' 6\")   Wt 80.3 kg (177 lb)   SpO2 96%   BMI 28.57 kg/m²   Body mass index is 28.57 kg/m².     Physical Exam    Physical Exam  Vitals and nursing note reviewed.   Pulmonary:      Effort: Pulmonary effort is normal.   Chest:          Comments: 3mm nodule, mobile nontender  around the 10-11 oclock location superior right upper outer quadrant (above the nipple)  Skin:     General: Skin is warm.   Neurological:      Mental Status: She is alert and oriented to person, place, and time.   Psychiatric:         Mood and Affect: Mood normal.        I have reviewed the patient's past medical history, social history

## 2025-01-22 ASSESSMENT — ENCOUNTER SYMPTOMS
COUGH: 0
SHORTNESS OF BREATH: 0

## 2025-01-28 ENCOUNTER — HOSPITAL ENCOUNTER (OUTPATIENT)
Dept: MAMMOGRAPHY | Age: 55
Discharge: HOME | End: 2025-01-31
Payer: COMMERCIAL

## 2025-01-28 VITALS — BODY MASS INDEX: 28.32 KG/M2 | HEIGHT: 65 IN | WEIGHT: 170 LBS

## 2025-01-28 DIAGNOSIS — N63.11 MASS OF UPPER OUTER QUADRANT OF RIGHT BREAST: ICD-10-CM

## 2025-01-28 PROCEDURE — 76642 ULTRASOUND BREAST LIMITED: CPT

## 2025-01-28 PROCEDURE — G0279 TOMOSYNTHESIS, MAMMO: HCPCS

## 2025-01-28 PROCEDURE — 77065 DX MAMMO INCL CAD UNI: CPT

## 2025-02-18 ENCOUNTER — HOSPITAL ENCOUNTER (OUTPATIENT)
Age: 55
Discharge: HOME | End: 2025-02-20

## 2025-02-28 ENCOUNTER — TELEPHONE (OUTPATIENT)
Dept: SLEEP MEDICINE | Age: 55
End: 2025-02-28

## 2025-03-24 ASSESSMENT — SLEEP AND FATIGUE QUESTIONNAIRES
HOW LIKELY ARE YOU TO NOD OFF OR FALL ASLEEP WHILE LYING DOWN TO REST IN THE AFTERNOON WHEN CIRCUMSTANCES PERMIT: HIGH CHANCE OF DOZING
HOW LIKELY ARE YOU TO NOD OFF OR FALL ASLEEP WHILE SITTING AND READING: SLIGHT CHANCE OF DOZING
HOW LIKELY ARE YOU TO NOD OFF OR FALL ASLEEP WHEN YOU ARE A PASSENGER IN A CAR FOR AN HOUR WITHOUT A BREAK: HIGH CHANCE OF DOZING
HOW LIKELY ARE YOU TO NOD OFF OR FALL ASLEEP WHILE WATCHING TV: SLIGHT CHANCE OF DOZING
HOW LIKELY ARE YOU TO NOD OFF OR FALL ASLEEP WHILE SITTING AND TALKING TO SOMEONE: WOULD NEVER DOZE
ESS TOTAL SCORE: 10
HOW LIKELY ARE YOU TO NOD OFF OR FALL ASLEEP WHILE SITTING QUIETLY AFTER LUNCH WITHOUT ALCOHOL: SLIGHT CHANCE OF DOZING
HOW LIKELY ARE YOU TO NOD OFF OR FALL ASLEEP IN A CAR, WHILE STOPPED FOR A FEW MINUTES IN TRAFFIC: WOULD NEVER DOZE
HOW LIKELY ARE YOU TO NOD OFF OR FALL ASLEEP WHILE SITTING INACTIVE IN A PUBLIC PLACE: SLIGHT CHANCE OF DOZING

## 2025-03-24 NOTE — PROGRESS NOTES
[facility]  [address]  [place] [address]  [phone]    Patient Name:  Kervin Burleson  YOB: 1970      Office Visit 3/25/2025    CHIEF COMPLAINT:    Chief Complaint   Patient presents with    Sleep Study    Results    Sleep Apnea       HISTORY OF PRESENT ILLNESS:      The patient presents in outpatient consultation at the request of DELANEY Blevins for management of obstructive sleep apnea.  PMHx is significant for HTN, insomnia, palpitations, GERD, edema.    The diagnostic HST from 2/21/2025 was notable for an apnea hypopnea index of 14.6 including 55 obstructive apneas and 34 hypopneas.  Oxygen desaturations are low as 83% were noted with SpO2 less than 89% for a total of 46.9 minutes of the test.     Patient states that she was referred for sleep study due to history of snoring and chronic fatigue.  She was originally dx   with EDITH on 9/14/2017 and opted for APAP treatment at the time. There is no study available in the chart for review.  She stopped using is as she wasn't sure if she was benefiting.    Patient typically gets in bed and reads for about 3 hours and sleeps starting around 10 PM it does not take her long to fall asleep.  She wakes up at 7 AM with an alarm she usually has to hit the snooze button 3-4 times.  She prefers to sleep on her side and she also wakes up on her side.  In order to fall asleep she uses Sonata and trazodone 25 mg every night at bedtime.  These are prescribed by her psychiatric NP.  Patient started seeing psychiatric NP about 1 year ago to help address intrusive thoughts.  Symptoms have improved with 1 year of Prozac and sedating medications.  Patient denies symptoms of restless leg.  She typically wakes up 1 time per night to use the restroom.  It usually takes her 1-1.5 hours to fall back asleep.  She will typically listen to an audiobook until she can fall back asleep.  She denies report of witnessed apneas.  She

## 2025-03-25 ENCOUNTER — OFFICE VISIT (OUTPATIENT)
Dept: SLEEP MEDICINE | Age: 55
End: 2025-03-25
Payer: COMMERCIAL

## 2025-03-25 VITALS
RESPIRATION RATE: 96 BRPM | DIASTOLIC BLOOD PRESSURE: 64 MMHG | TEMPERATURE: 97.3 F | HEART RATE: 79 BPM | SYSTOLIC BLOOD PRESSURE: 110 MMHG | HEIGHT: 65 IN | WEIGHT: 181 LBS | BODY MASS INDEX: 30.16 KG/M2

## 2025-03-25 DIAGNOSIS — E66.811 CLASS 1 OBESITY WITH SERIOUS COMORBIDITY AND BODY MASS INDEX (BMI) OF 30.0 TO 30.9 IN ADULT, UNSPECIFIED OBESITY TYPE: ICD-10-CM

## 2025-03-25 DIAGNOSIS — G47.33 OSA (OBSTRUCTIVE SLEEP APNEA): Primary | Chronic | ICD-10-CM

## 2025-03-25 DIAGNOSIS — G47.00 PERSISTENT DISORDER OF INITIATING OR MAINTAINING SLEEP: ICD-10-CM

## 2025-03-25 DIAGNOSIS — G47.10 HYPERSOMNOLENCE: ICD-10-CM

## 2025-03-25 DIAGNOSIS — G47.34 NOCTURNAL HYPOXEMIA: ICD-10-CM

## 2025-03-25 DIAGNOSIS — R06.83 SNORING: ICD-10-CM

## 2025-03-25 DIAGNOSIS — G47.8 NON-RESTORATIVE SLEEP: ICD-10-CM

## 2025-03-25 PROCEDURE — 3074F SYST BP LT 130 MM HG: CPT | Performed by: NURSE PRACTITIONER

## 2025-03-25 PROCEDURE — 99204 OFFICE O/P NEW MOD 45 MIN: CPT | Performed by: NURSE PRACTITIONER

## 2025-03-25 PROCEDURE — 3078F DIAST BP <80 MM HG: CPT | Performed by: NURSE PRACTITIONER

## 2025-03-25 ASSESSMENT — ENCOUNTER SYMPTOMS
EYE REDNESS: 0
NAUSEA: 0
ABDOMINAL PAIN: 0
ANAL BLEEDING: 0
APNEA: 0
SHORTNESS OF BREATH: 0
BLOOD IN STOOL: 0
DIARRHEA: 0
SORE THROAT: 0
EYE PAIN: 0
VOMITING: 0
VOICE CHANGE: 0

## 2025-03-25 NOTE — PATIENT INSTRUCTIONS
Will send order to [employer] [facility] for Supplies and machine settings  Follow up in 6 months -1 year for recheck     Learning About Sleeping Well  What does sleeping well mean?    Sleeping well means getting enough sleep. How much sleep is enough varies among people.  The number of hours you sleep is not as important as how you feel when you wake up. If you do not feel refreshed, you probably need more sleep. Another sign of not getting enough sleep is feeling tired during the day.  The average total nightly sleep time is 7½ to 8 hours. Healthy adults may need a little more or a little less than this.  Why is getting enough sleep important?  Getting enough quality sleep is a basic part of good health. When your sleep suffers, your mood and your thoughts can suffer too. You may find yourself feeling more grumpy or stressed. Not getting enough sleep also can lead to serious problems, including injury, accidents, anxiety, and depression.  What might cause poor sleeping?  Many things can cause sleep problems, including:  Stress. Stress can be caused by fear about a single event, such as giving a speech. Or you may have ongoing stress, such as worry about work or school.  Depression, anxiety, and other mental or emotional conditions.  Changes in your sleep habits or surroundings. This includes changes that happen where you sleep, such as noise, light, or sleeping in a different bed. It also includes changes in your sleep pattern, such as having jet lag or working a late shift.  Health problems, such as pain, breathing problems, and restless legs syndrome.  Lack of regular exercise.  How can you help yourself?  Here are some tips that may help you sleep more soundly and wake up feeling more refreshed.  Your sleeping area  Use your bedroom only for sleeping and sex. A bit of light reading may help you fall asleep. But if it doesn't, do your reading elsewhere in the house. Don't watch TV in bed.  Be sure your bed is big enough to

## 2025-06-18 ENCOUNTER — OFFICE VISIT (OUTPATIENT)
Dept: OBGYN CLINIC | Age: 55
End: 2025-06-18
Payer: COMMERCIAL

## 2025-06-18 VITALS — BODY MASS INDEX: 28.96 KG/M2 | DIASTOLIC BLOOD PRESSURE: 82 MMHG | SYSTOLIC BLOOD PRESSURE: 118 MMHG | WEIGHT: 174 LBS

## 2025-06-18 DIAGNOSIS — N89.8 VAGINAL DISCHARGE: ICD-10-CM

## 2025-06-18 DIAGNOSIS — N90.89 VULVAR LESION: ICD-10-CM

## 2025-06-18 DIAGNOSIS — L98.9 SKIN LESION: ICD-10-CM

## 2025-06-18 DIAGNOSIS — N89.8 VAGINAL IRRITATION: ICD-10-CM

## 2025-06-18 DIAGNOSIS — N94.89 VAGINAL BURNING: Primary | ICD-10-CM

## 2025-06-18 LAB — WET PREP (POC): NORMAL

## 2025-06-18 PROCEDURE — 99459 PELVIC EXAMINATION: CPT | Performed by: OBSTETRICS & GYNECOLOGY

## 2025-06-18 PROCEDURE — 87210 SMEAR WET MOUNT SALINE/INK: CPT | Performed by: OBSTETRICS & GYNECOLOGY

## 2025-06-18 PROCEDURE — 99214 OFFICE O/P EST MOD 30 MIN: CPT | Performed by: OBSTETRICS & GYNECOLOGY

## 2025-06-18 PROCEDURE — 3079F DIAST BP 80-89 MM HG: CPT | Performed by: OBSTETRICS & GYNECOLOGY

## 2025-06-18 PROCEDURE — 3074F SYST BP LT 130 MM HG: CPT | Performed by: OBSTETRICS & GYNECOLOGY

## 2025-06-18 RX ORDER — VALACYCLOVIR HYDROCHLORIDE 1 G/1
TABLET, FILM COATED ORAL
Qty: 30 TABLET | Refills: 1 | Status: SHIPPED | OUTPATIENT
Start: 2025-06-18

## 2025-06-18 RX ORDER — ACYCLOVIR 50 MG/G
OINTMENT TOPICAL
Qty: 30 G | Refills: 5 | Status: SHIPPED | OUTPATIENT
Start: 2025-06-18 | End: 2025-06-25

## 2025-06-18 NOTE — PROGRESS NOTES
HPI  Kervin Burleson is a 54 y.o. female seen for vulvar pain and burning.  This started Monday after aggressive intercourse the day before.      Past Medical History, Past Surgical History, Family history, Social History, and Medications were all reviewed with the patient today and updated as necessary.     Current Outpatient Medications   Medication Sig    valACYclovir (VALTREX) 1 g tablet BID for 5 days then prn    acyclovir (ZOVIRAX) 5 % ointment Apply topically 5 times daily.    estradiol (ESTRACE) 2 MG tablet Take 1 tablet by mouth at bedtime TAKE 1 TABLET BY MOUTH EVERY DAY    vitamin D (CHOLECALCIFEROL) 88000 UNIT CAPS Take 1 capsule by mouth every 7 days Takes on Fridays    losartan (COZAAR) 100 MG tablet Take 1 tablet by mouth daily    omeprazole (PRILOSEC) 40 MG delayed release capsule Take 1 capsule by mouth daily TAKE 1 CAPSULE DAILY 30 min prior to breakfast    hydroCHLOROthiazide (HYDRODIURIL) 25 MG tablet Take 1 tablet by mouth daily TAKE 1 TABLET BY MOUTH EVERY DAY    zaleplon (SONATA) 10 MG capsule Take 1 capsule by mouth nightly.    FLUoxetine (PROZAC) 10 MG capsule Take 1 capsule by mouth daily    traZODone (DESYREL) 100 MG tablet Take 1 tablet by mouth nightly 1 tablet qhs.    ipratropium (ATROVENT) 0.03 % nasal spray 2 sprays by Each Nostril route every 8 hours as needed for Rhinitis    fluticasone (FLONASE) 50 MCG/ACT nasal spray 2 sprays by Nasal route daily    ibuprofen (ADVIL;MOTRIN) 200 MG tablet Take 4 tablets by mouth as needed for Pain    potassium chloride (KLOR-CON M) 10 MEQ extended release tablet Take 1 tablet by mouth 2 times daily (Patient not taking: Reported on 6/18/2025)     No current facility-administered medications for this visit.     Allergies   Allergen Reactions    Metronidazole Rash     Past Medical History:   Diagnosis Date    Allergic rhinitis due to pollen     Cervical radiculopathy     GERD (gastroesophageal reflux disease)     History of blood transfusion 1998

## 2025-06-21 LAB
HSV1 DNA SPEC QL NAA+PROBE: NEGATIVE
HSV2 DNA SPEC QL NAA+PROBE: NEGATIVE
SPECIMEN SOURCE: NORMAL

## 2025-06-23 ENCOUNTER — RESULTS FOLLOW-UP (OUTPATIENT)
Dept: OBGYN CLINIC | Age: 55
End: 2025-06-23

## 2025-06-30 DIAGNOSIS — E66.811 CLASS 1 OBESITY WITH SERIOUS COMORBIDITY AND BODY MASS INDEX (BMI) OF 30.0 TO 30.9 IN ADULT, UNSPECIFIED OBESITY TYPE: Primary | ICD-10-CM

## 2025-06-30 NOTE — PROGRESS NOTES
Orders Placed This Encounter   Procedures    [facility] - Weight Management     Referral Priority:   Routine     Referral Type:   Eval and Treat     Referral Reason:   Specialty Services Required     Number of Visits Requested:   1     Per patient request.

## 2025-08-05 ENCOUNTER — OFFICE VISIT (OUTPATIENT)
Dept: SURGERY | Age: 55
End: 2025-08-05
Payer: COMMERCIAL

## 2025-08-05 VITALS
WEIGHT: 179 LBS | DIASTOLIC BLOOD PRESSURE: 69 MMHG | SYSTOLIC BLOOD PRESSURE: 118 MMHG | HEART RATE: 89 BPM | HEIGHT: 66 IN | BODY MASS INDEX: 28.77 KG/M2

## 2025-08-05 DIAGNOSIS — G47.33 OSA (OBSTRUCTIVE SLEEP APNEA): Primary | Chronic | ICD-10-CM

## 2025-08-05 DIAGNOSIS — E78.2 MIXED HYPERLIPIDEMIA: ICD-10-CM

## 2025-08-05 DIAGNOSIS — I10 PRIMARY HYPERTENSION: ICD-10-CM

## 2025-08-05 DIAGNOSIS — E66.3 OVERWEIGHT WITH BODY MASS INDEX (BMI) OF 29 TO 29.9 IN ADULT: ICD-10-CM

## 2025-08-05 DIAGNOSIS — Z71.3 NUTRITIONAL COUNSELING: ICD-10-CM

## 2025-08-05 DIAGNOSIS — K21.9 GASTROESOPHAGEAL REFLUX DISEASE, UNSPECIFIED WHETHER ESOPHAGITIS PRESENT: ICD-10-CM

## 2025-08-05 DIAGNOSIS — Z71.82 EXERCISE COUNSELING: ICD-10-CM

## 2025-08-05 PROBLEM — E55.9 VITAMIN D DEFICIENCY: Status: ACTIVE | Noted: 2021-07-19

## 2025-08-05 PROBLEM — F51.01 PRIMARY INSOMNIA: Status: ACTIVE | Noted: 2018-05-10

## 2025-08-05 PROBLEM — E04.1 THYROID NODULE: Status: ACTIVE | Noted: 2021-02-01

## 2025-08-05 PROCEDURE — 99205 OFFICE O/P NEW HI 60 MIN: CPT | Performed by: PHYSICIAN ASSISTANT

## 2025-08-05 PROCEDURE — 3078F DIAST BP <80 MM HG: CPT | Performed by: PHYSICIAN ASSISTANT

## 2025-08-05 PROCEDURE — 3074F SYST BP LT 130 MM HG: CPT | Performed by: PHYSICIAN ASSISTANT

## 2025-08-05 RX ORDER — FLUOXETINE HYDROCHLORIDE 60 MG/1
TABLET, FILM COATED ORAL; ORAL
COMMUNITY
Start: 2025-07-07

## 2025-08-05 RX ORDER — ESZOPICLONE 3 MG/1
TABLET, FILM COATED ORAL
COMMUNITY
Start: 2025-08-04

## 2025-08-11 DIAGNOSIS — I10 PRIMARY HYPERTENSION: ICD-10-CM

## 2025-08-11 DIAGNOSIS — E66.3 OVERWEIGHT WITH BODY MASS INDEX (BMI) OF 29 TO 29.9 IN ADULT: ICD-10-CM

## 2025-08-11 DIAGNOSIS — G47.33 OSA (OBSTRUCTIVE SLEEP APNEA): Chronic | ICD-10-CM

## 2025-08-11 DIAGNOSIS — E78.2 MIXED HYPERLIPIDEMIA: ICD-10-CM

## 2025-08-11 LAB
25(OH)D3 SERPL-MCNC: 35.4 NG/ML (ref 30–100)
ALBUMIN SERPL-MCNC: 3.2 G/DL (ref 3.5–5)
ALBUMIN/GLOB SERPL: 0.9 (ref 1–1.9)
ALP SERPL-CCNC: 92 U/L (ref 35–104)
ALT SERPL-CCNC: 14 U/L (ref 8–45)
ANION GAP SERPL CALC-SCNC: 13 MMOL/L (ref 7–16)
AST SERPL-CCNC: 17 U/L (ref 15–37)
BASOPHILS # BLD: 0.07 K/UL (ref 0–0.2)
BASOPHILS NFR BLD: 1.1 % (ref 0–2)
BILIRUB SERPL-MCNC: 0.3 MG/DL (ref 0–1.2)
BUN SERPL-MCNC: 13 MG/DL (ref 6–23)
CALCIUM SERPL-MCNC: 9.1 MG/DL (ref 8.8–10.2)
CHLORIDE SERPL-SCNC: 97 MMOL/L (ref 98–107)
CHOLEST SERPL-MCNC: 224 MG/DL (ref 0–200)
CO2 SERPL-SCNC: 26 MMOL/L (ref 20–29)
CREAT SERPL-MCNC: 0.76 MG/DL (ref 0.6–1.1)
DIFFERENTIAL METHOD BLD: NORMAL
EOSINOPHIL # BLD: 0.27 K/UL (ref 0–0.8)
EOSINOPHIL NFR BLD: 4.2 % (ref 0.5–7.8)
ERYTHROCYTE [DISTWIDTH] IN BLOOD BY AUTOMATED COUNT: 12.9 % (ref 11.9–14.6)
EST. AVERAGE GLUCOSE BLD GHB EST-MCNC: 109 MG/DL
GLOBULIN SER CALC-MCNC: 3.7 G/DL (ref 2.3–3.5)
GLUCOSE SERPL-MCNC: 90 MG/DL (ref 70–99)
HBA1C MFR BLD: 5.4 % (ref 0–5.6)
HCT VFR BLD AUTO: 37.8 % (ref 35.8–46.3)
HDLC SERPL-MCNC: 82 MG/DL (ref 40–60)
HDLC SERPL: 2.7 (ref 0–5)
HGB BLD-MCNC: 12.9 G/DL (ref 11.7–15.4)
IMM GRANULOCYTES # BLD AUTO: 0.03 K/UL (ref 0–0.5)
IMM GRANULOCYTES NFR BLD AUTO: 0.5 % (ref 0–5)
LDLC SERPL CALC-MCNC: 105 MG/DL (ref 0–100)
LYMPHOCYTES # BLD: 2.05 K/UL (ref 0.5–4.6)
LYMPHOCYTES NFR BLD: 31.6 % (ref 13–44)
MCH RBC QN AUTO: 29.6 PG (ref 26.1–32.9)
MCHC RBC AUTO-ENTMCNC: 34.1 G/DL (ref 31.4–35)
MCV RBC AUTO: 86.7 FL (ref 82–102)
MONOCYTES # BLD: 0.39 K/UL (ref 0.1–1.3)
MONOCYTES NFR BLD: 6 % (ref 4–12)
NEUTS SEG # BLD: 3.68 K/UL (ref 1.7–8.2)
NEUTS SEG NFR BLD: 56.6 % (ref 43–78)
NRBC # BLD: 0 K/UL (ref 0–0.2)
PLATELET # BLD AUTO: 369 K/UL (ref 150–450)
PMV BLD AUTO: 9.5 FL (ref 9.4–12.3)
POTASSIUM SERPL-SCNC: 3.4 MMOL/L (ref 3.5–5.1)
PROT SERPL-MCNC: 6.9 G/DL (ref 6.3–8.2)
RBC # BLD AUTO: 4.36 M/UL (ref 4.05–5.2)
SODIUM SERPL-SCNC: 137 MMOL/L (ref 136–145)
TRIGL SERPL-MCNC: 183 MG/DL (ref 0–150)
TSH, 3RD GENERATION: 1.84 UIU/ML (ref 0.27–4.2)
VLDLC SERPL CALC-MCNC: 37 MG/DL (ref 6–23)
WBC # BLD AUTO: 6.5 K/UL (ref 4.3–11.1)

## 2025-08-25 ENCOUNTER — TELEMEDICINE (OUTPATIENT)
Dept: SURGERY | Age: 55
End: 2025-08-25

## 2025-08-25 DIAGNOSIS — Z71.3 NUTRITIONAL COUNSELING: Primary | ICD-10-CM

## 2025-09-04 ENCOUNTER — OFFICE VISIT (OUTPATIENT)
Age: 55
End: 2025-09-04
Payer: COMMERCIAL

## 2025-09-04 VITALS
WEIGHT: 181.6 LBS | DIASTOLIC BLOOD PRESSURE: 70 MMHG | RESPIRATION RATE: 16 BRPM | OXYGEN SATURATION: 95 % | SYSTOLIC BLOOD PRESSURE: 104 MMHG | HEIGHT: 66 IN | HEART RATE: 74 BPM | BODY MASS INDEX: 29.18 KG/M2

## 2025-09-04 DIAGNOSIS — K21.9 GASTROESOPHAGEAL REFLUX DISEASE, UNSPECIFIED WHETHER ESOPHAGITIS PRESENT: Primary | ICD-10-CM

## 2025-09-04 DIAGNOSIS — R13.19 ESOPHAGEAL DYSPHAGIA: ICD-10-CM

## 2025-09-04 PROCEDURE — 3074F SYST BP LT 130 MM HG: CPT | Performed by: PHYSICIAN ASSISTANT

## 2025-09-04 PROCEDURE — 99203 OFFICE O/P NEW LOW 30 MIN: CPT | Performed by: PHYSICIAN ASSISTANT

## 2025-09-04 PROCEDURE — 3078F DIAST BP <80 MM HG: CPT | Performed by: PHYSICIAN ASSISTANT

## 2025-09-04 RX ORDER — METHYLPREDNISOLONE 4 MG/1
4 TABLET ORAL
COMMUNITY
Start: 2025-09-03

## 2025-09-04 RX ORDER — FAMOTIDINE 40 MG/1
40 TABLET, FILM COATED ORAL EVERY EVENING
Qty: 90 TABLET | Refills: 0 | Status: SHIPPED | OUTPATIENT
Start: 2025-09-04

## (undated) DEVICE — PACKING NSL W4XL4CM SNUS STNT DISP MEROGEL

## (undated) DEVICE — PACKING 8004008 NEURAY 200PK 25X76MM: Brand: NEURAY ®

## (undated) DEVICE — SYRINGE MEDICAL 3ML CLEAR PLASTIC STANDARD NON CONTROL LUERLOCK TIP DISPOSABLE

## (undated) DEVICE — REFLEX ULTRA 45 WITH INTEGRATED CABLE: Brand: COBLATION

## (undated) DEVICE — KENDALL RADIOLUCENT FOAM MONITORING ELECTRODE RECTANGULAR SHAPE: Brand: KENDALL

## (undated) DEVICE — HEAD AND NECK: Brand: MEDLINE INDUSTRIES, INC.

## (undated) DEVICE — AIRLIFE™ OXYGEN TUBING 7 FEET (2.1 M) CRUSH RESISTANT OXYGEN TUBING, VINYL TIPPED: Brand: AIRLIFE™

## (undated) DEVICE — FORCEPS BX L240CM JAW DIA2.8MM L CAP W/ NDL MIC MESH TOOTH

## (undated) DEVICE — BLADE 1884080EM TRICUT 4MMX13CM M4 ROHS: Brand: FUSION®

## (undated) DEVICE — CONNECTOR TBNG OD5-7MM O2 END DISP

## (undated) DEVICE — TUBING 1895522 5PK STRAIGHTSHOT TO XPS: Brand: STRAIGHTSHOT®

## (undated) DEVICE — SINGLE PORT MANIFOLD: Brand: NEPTUNE 2

## (undated) DEVICE — GAUZE,SPONGE,4"X4",12PLY,WOVEN,NS,LF: Brand: MEDLINE

## (undated) DEVICE — SYR LR LCK 1ML GRAD NSAF 30ML --

## (undated) DEVICE — SPLINT NSL SEPTAL SUPP REG PRE PUNCHED HOLE SIL STRL BRTH EZ

## (undated) DEVICE — MEDI-VAC NON-CONDUCTIVE SUCTION TUBING: Brand: CARDINAL HEALTH

## (undated) DEVICE — SYRINGE MED 10ML LUERLOCK TIP W/O SFTY DISP

## (undated) DEVICE — CONTAINER FORMALIN PREFILLED 10% NBF 60ML

## (undated) DEVICE — SOL ANTI-FOG 6ML MEDC -- MEDICHOICE - CONVERT TO 358427

## (undated) DEVICE — SUT SLK 2-0 18IN FS BLK --

## (undated) DEVICE — SOLUTION IV 1000ML 0.9% SOD CHL

## (undated) DEVICE — NEEDLE SYRINGE 18GA L1.5IN RED PLAS HUB S STL BLNT FILL W/O

## (undated) DEVICE — PATIENT TRACKER 9734887XOM NON-INVASIVE

## (undated) DEVICE — 2000CC GUARDIAN II: Brand: GUARDIAN

## (undated) DEVICE — ENDOSCOPIC KIT 1.1+ OP4 CA DE 2 GWN AAMI LEVEL 3

## (undated) DEVICE — LUBE JELLY FOIL PACK 1.4 OZ: Brand: MEDLINE INDUSTRIES, INC.

## (undated) DEVICE — SOLUTION LACTATED RINGERS INJECTION USP

## (undated) DEVICE — INSTRUMENT TRACKER 9733533XOM ENT 1PK